# Patient Record
Sex: FEMALE | Race: OTHER | NOT HISPANIC OR LATINO
[De-identification: names, ages, dates, MRNs, and addresses within clinical notes are randomized per-mention and may not be internally consistent; named-entity substitution may affect disease eponyms.]

---

## 2017-02-14 ENCOUNTER — APPOINTMENT (OUTPATIENT)
Dept: VASCULAR SURGERY | Facility: CLINIC | Age: 82
End: 2017-02-14

## 2017-08-24 ENCOUNTER — APPOINTMENT (OUTPATIENT)
Dept: ORTHOPEDIC SURGERY | Facility: CLINIC | Age: 82
End: 2017-08-24
Payer: MEDICARE

## 2017-08-24 ENCOUNTER — APPOINTMENT (OUTPATIENT)
Dept: ORTHOPEDIC SURGERY | Facility: CLINIC | Age: 82
End: 2017-08-24

## 2017-08-24 VITALS
HEART RATE: 80 BPM | HEIGHT: 60 IN | DIASTOLIC BLOOD PRESSURE: 88 MMHG | BODY MASS INDEX: 27.09 KG/M2 | WEIGHT: 138 LBS | SYSTOLIC BLOOD PRESSURE: 158 MMHG

## 2017-08-24 DIAGNOSIS — Z60.2 PROBLEMS RELATED TO LIVING ALONE: ICD-10-CM

## 2017-08-24 DIAGNOSIS — M16.0 BILATERAL PRIMARY OSTEOARTHRITIS OF HIP: ICD-10-CM

## 2017-08-24 DIAGNOSIS — Z80.9 FAMILY HISTORY OF MALIGNANT NEOPLASM, UNSPECIFIED: ICD-10-CM

## 2017-08-24 DIAGNOSIS — Z82.61 FAMILY HISTORY OF ARTHRITIS: ICD-10-CM

## 2017-08-24 PROCEDURE — 99204 OFFICE O/P NEW MOD 45 MIN: CPT

## 2017-08-24 RX ORDER — CALCIUM CARBONATE 600 MG
TABLET ORAL
Refills: 0 | Status: ACTIVE | COMMUNITY

## 2017-08-24 SDOH — SOCIAL STABILITY - SOCIAL INSECURITY: PROBLEMS RELATED TO LIVING ALONE: Z60.2

## 2018-12-19 ENCOUNTER — APPOINTMENT (OUTPATIENT)
Dept: CARDIOLOGY | Facility: CLINIC | Age: 83
End: 2018-12-19

## 2019-10-02 PROBLEM — Z60.2 PERSON LIVING ALONE: Status: ACTIVE | Noted: 2017-08-24

## 2021-01-06 ENCOUNTER — APPOINTMENT (OUTPATIENT)
Dept: COLORECTAL SURGERY | Facility: CLINIC | Age: 86
End: 2021-01-06
Payer: MEDICARE

## 2021-01-06 VITALS
OXYGEN SATURATION: 97 % | WEIGHT: 143 LBS | DIASTOLIC BLOOD PRESSURE: 86 MMHG | BODY MASS INDEX: 28.07 KG/M2 | RESPIRATION RATE: 16 BRPM | SYSTOLIC BLOOD PRESSURE: 157 MMHG | HEIGHT: 60 IN | TEMPERATURE: 98 F | HEART RATE: 60 BPM

## 2021-01-06 DIAGNOSIS — Z86.79 PERSONAL HISTORY OF OTHER DISEASES OF THE CIRCULATORY SYSTEM: ICD-10-CM

## 2021-01-06 DIAGNOSIS — K62.5 HEMORRHAGE OF ANUS AND RECTUM: ICD-10-CM

## 2021-01-06 PROCEDURE — 99205 OFFICE O/P NEW HI 60 MIN: CPT

## 2021-01-06 PROCEDURE — 45300 PROCTOSIGMOIDOSCOPY DX: CPT

## 2021-01-06 RX ORDER — GABAPENTIN 100 MG/1
100 CAPSULE ORAL
Refills: 0 | Status: DISCONTINUED | COMMUNITY
End: 2021-01-06

## 2021-01-06 RX ORDER — METOPROLOL SUCCINATE 50 MG/1
50 TABLET, EXTENDED RELEASE ORAL
Refills: 0 | Status: ACTIVE | COMMUNITY

## 2021-01-06 RX ORDER — IRBESARTAN 150 MG/1
150 TABLET ORAL
Refills: 0 | Status: ACTIVE | COMMUNITY

## 2021-01-06 RX ORDER — HYDROCORTISONE 2.5% 25 MG/G
2.5 CREAM TOPICAL
Qty: 1 | Refills: 3 | Status: ACTIVE | COMMUNITY
Start: 2021-01-06 | End: 1900-01-01

## 2021-01-06 RX ORDER — PRAMIPEXOLE DIHYDROCHLORIDE 0.25 MG/1
0.25 TABLET ORAL
Refills: 0 | Status: DISCONTINUED | COMMUNITY
End: 2021-01-06

## 2021-01-06 NOTE — REVIEW OF SYSTEMS
[As Noted in HPI] : as noted in HPI [Fever] : no fever [Chills] : no chills [Feeling Poorly] : not feeling poorly [Recent Weight Loss (___ Lbs)] : no recent weight loss [Negative] : Musculoskeletal [FreeTextEntry3] : wears glasses [FreeTextEntry5] : HTN [FreeTextEntry9] : Hip replacement bilateral

## 2021-01-06 NOTE — ASSESSMENT
[FreeTextEntry1] : 87-year-old female with chief complaint of bleeding per rectum intermittently. She does have clotted blood and species in her rectal vault but no obvious source. Her hemorrhoids did not appear to be swollen or inflamed.\par \par I had an extensive discussion of the risks and benefits of performing a colonoscopy with the patient.  I explained the risks, which include but are not limited to, bleeding, infection, as well as perforation requiring emergent operative intervention and likely a colostomy. the patient understands these risks and is willing to proceed with their colonoscopy.\par

## 2021-01-06 NOTE — PHYSICAL EXAM
[Normal Breath Sounds] : Normal breath sounds [Normal Heart Sounds] : normal heart sounds [Normal Rate and Rhythm] : normal rate and rhythm [Alert] : alert [Oriented to Person] : oriented to person [Oriented to Place] : oriented to place [Oriented to Time] : oriented to time [Calm] : calm [Abdomen Masses] : No abdominal masses [Tender] : nontender [Normal rectal exam] : exam was normal [None] : no anal fissures seen [Excoriation] : no perianal excoriation [Multiple Sinus Tracts] : no perianal sinus tracts [Fistula] : no fistulas [Wart] : no warts [Ulcer ___ cm] : no ulcers [Pilonidal Cyst] : no pilonidal cysts [Pilonidal Sinus] : no pilonidal sinus [Pilonidal Sinus Draining] : no pilonidal sinus drainage [Tender, Swollen] : nontender, non-swollen [Normal] : was normal [Stool Sample Taken] : no stool obtained on rectal exam [Wheezing] : no wheezing was heard [de-identified] : soft, +BS [de-identified] : rigid sig reveals small clotted blood and stool, no lesions identified to 20cm [de-identified] : elderly female [de-identified] : NC/AT [de-identified] : ambulates with walker [de-identified] : intact

## 2021-01-06 NOTE — HISTORY OF PRESENT ILLNESS
[FreeTextEntry1] : Patient is a 86 yo WF, here with c/o rectal pain and bleeding. Patient experiencing rectal pain and pressure at night while laying down. She saw bloody mucous discharge after bowel movement several days ago. She has intermittent rectal bleeding. Patient generally has hard stools, takes MiraLax to promote bowel movements.

## 2021-02-24 ENCOUNTER — APPOINTMENT (OUTPATIENT)
Dept: OBGYN | Facility: CLINIC | Age: 86
End: 2021-02-24
Payer: MEDICARE

## 2021-02-24 VITALS
WEIGHT: 144 LBS | DIASTOLIC BLOOD PRESSURE: 78 MMHG | SYSTOLIC BLOOD PRESSURE: 159 MMHG | HEIGHT: 60 IN | BODY MASS INDEX: 28.27 KG/M2

## 2021-02-24 DIAGNOSIS — L72.3 SEBACEOUS CYST: ICD-10-CM

## 2021-02-24 PROCEDURE — G0101: CPT

## 2021-02-24 NOTE — HISTORY OF PRESENT ILLNESS
[FreeTextEntry1] : 88 year old female  Ab1, LMP age 32 ([facility] for fibroids) c/o R breast skin tag discovered x 2 years and sharp intermittent pain in perineum x 2 months mostly relieved by changing her underwear.  Pt to have colonoscopy for recent episode of rectal bleeding.

## 2021-02-24 NOTE — PHYSICAL EXAM
[Normal] : normal [The Right Breast Was Examined] : a normal appearance [Breast Nipple Inversion Right] : inverted [Breast Mass Right Breast ___cm] : no was mass palpable [No Masses] : no breast masses were palpable

## 2021-03-13 ENCOUNTER — APPOINTMENT (OUTPATIENT)
Dept: DISASTER EMERGENCY | Facility: CLINIC | Age: 86
End: 2021-03-13

## 2021-03-18 ENCOUNTER — APPOINTMENT (OUTPATIENT)
Dept: COLORECTAL SURGERY | Facility: CLINIC | Age: 86
End: 2021-03-18

## 2021-12-10 ENCOUNTER — APPOINTMENT (OUTPATIENT)
Dept: ENDOCRINOLOGY | Facility: CLINIC | Age: 86
End: 2021-12-10
Payer: MEDICARE

## 2021-12-10 VITALS
HEIGHT: 60 IN | BODY MASS INDEX: 27.48 KG/M2 | WEIGHT: 140 LBS | HEART RATE: 80 BPM | SYSTOLIC BLOOD PRESSURE: 152 MMHG | DIASTOLIC BLOOD PRESSURE: 88 MMHG | OXYGEN SATURATION: 96 %

## 2021-12-10 DIAGNOSIS — Z87.891 PERSONAL HISTORY OF NICOTINE DEPENDENCE: ICD-10-CM

## 2021-12-10 DIAGNOSIS — Z78.9 OTHER SPECIFIED HEALTH STATUS: ICD-10-CM

## 2021-12-10 PROCEDURE — 99204 OFFICE O/P NEW MOD 45 MIN: CPT

## 2021-12-10 RX ORDER — IRBESARTAN 150 MG/1
150 TABLET ORAL
Refills: 0 | Status: ACTIVE | COMMUNITY

## 2021-12-10 RX ORDER — METOPROLOL SUCCINATE 50 MG/1
50 TABLET, EXTENDED RELEASE ORAL
Refills: 0 | Status: ACTIVE | COMMUNITY

## 2021-12-10 RX ORDER — PRAVASTATIN SODIUM 40 MG/1
40 TABLET ORAL
Refills: 0 | Status: ACTIVE | COMMUNITY

## 2021-12-12 NOTE — DATA REVIEWED
[FreeTextEntry1] : Labs done at outside facility on 10/6/21:\par (to be scanned into EHR)\par TSH 1.54

## 2021-12-12 NOTE — ASSESSMENT
[Levothyroxine] : The patient was instructed to take Levothyroxine on an empty stomach, separate from vitamins, and wait at least 30 minutes before eating [FreeTextEntry1] : 1. h/o multinodular goiter\par s/p let subtotal hemithyroidectomy in [facility] [place] , pathology reportedly negative for malignancy\par s/p FNA of another thyroid nodule, patient unsure where/in which lobe and pathology as reportedly.\par Used to take levothyroxine from 1990s-2000s era, however since then no longer required use of thyroid medication.\par Reviewed TSH from 10/2021 is 1.54, within normal range\par Clinically euthyroid today\par Instructed patient to obtain thyroid US sonogram to further evaluate for thyroid nodule and thyroid remnant of left lobe and/or presence of isthmus\par Instructed patient to try to obtain records from previous providers.\par \par Answered all questions today; patient verbalized understanding.\par RTC in 2-3 months.

## 2021-12-12 NOTE — REVIEW OF SYSTEMS
[Difficulty Walking] : difficulty walking [Poor Balance] : poor balance [As Noted in HPI] : as noted in HPI [Fatigue] : no fatigue [Decreased Appetite] : appetite not decreased [Recent Weight Gain (___ Lbs)] : no recent weight gain [Recent Weight Loss (___ Lbs)] : no recent weight loss [Fever] : no fever [Chills] : no chills [Visual Field Defect] : no visual field defect [Dry Eyes] : no dryness [Blurred Vision] : no blurred vision [Dysphagia] : no dysphagia [Neck Pain] : no neck pain [Hearing Loss] : no hearing loss  [Dysphonia] : no dysphonia [Nasal Congestion] : no nasal congestion [Chest Pain] : no chest pain [Slow Heart Rate] : heart rate is not slow [Leg Claudication] : no leg claudication [Palpitations] : no palpitations [Fast Heart Rate] : heart rate is not fast [Lower Ext Edema] : no lower extremity edema [Shortness Of Breath] : no shortness of breath [Cough] : no cough [Nausea] : no nausea [Constipation] : no constipation [Abdominal Pain] : no abdominal pain [Vomiting] : no vomiting [Diarrhea] : no diarrhea [Polyuria] : no polyuria [Joint Pain] : no joint pain [Muscle Weakness] : no muscle weakness [Acanthosis] : no acanthosis  [Acne] : no acne [Dry Skin] : no dry skin [Headaches] : no headaches [Dizziness] : no dizziness [Tremors] : no tremors [Pain/Numbness of Digits] : no pain/numbness of digits

## 2021-12-12 NOTE — PHYSICAL EXAM
[Alert] : alert [No Acute Distress] : no acute distress [Normal Sclera/Conjunctiva] : normal sclera/conjunctiva [EOMI] : extra ocular movement intact [No Proptosis] : no proptosis [No Lid Lag] : no lid lag [Normal Hearing] : hearing was normal [No LAD] : no lymphadenopathy [Thyroid Not Enlarged] : the thyroid was not enlarged [Well Healed Scar] : well healed scar [No Respiratory Distress] : no respiratory distress [Normal Rate and Effort] : normal respiratory rate and effort [Normal Bowel Sounds] : normal bowel sounds [Soft] : abdomen soft [Normal Reflexes] : deep tendon reflexes were 2+ and symmetric [No Tremors] : no tremors [Oriented x3] : oriented to person, place, and time [No Stigmata of Cushings Syndrome] : no stigmata of Cushings Syndrome [Normal Gait] : normal gait [No Clubbing, Cyanosis] : no clubbing  or cyanosis of the fingernails [Abdominal Striae] : no abdominal striae [Acanthosis Nigricans] : no acanthosis nigricans [de-identified] : noted scar at base of neck extending to left, nontender gland upon palpation

## 2022-02-04 ENCOUNTER — APPOINTMENT (OUTPATIENT)
Dept: ULTRASOUND IMAGING | Facility: IMAGING CENTER | Age: 87
End: 2022-02-04
Payer: MEDICARE

## 2022-02-04 ENCOUNTER — OUTPATIENT (OUTPATIENT)
Dept: OUTPATIENT SERVICES | Facility: HOSPITAL | Age: 87
LOS: 1 days | End: 2022-02-04
Payer: MEDICARE

## 2022-02-04 DIAGNOSIS — E89.0 POSTPROCEDURAL HYPOTHYROIDISM: ICD-10-CM

## 2022-02-04 PROCEDURE — 76536 US EXAM OF HEAD AND NECK: CPT | Mod: 26

## 2022-02-04 PROCEDURE — 76536 US EXAM OF HEAD AND NECK: CPT

## 2022-02-11 ENCOUNTER — APPOINTMENT (OUTPATIENT)
Dept: ENDOCRINOLOGY | Facility: CLINIC | Age: 87
End: 2022-02-11
Payer: MEDICARE

## 2022-02-11 VITALS
OXYGEN SATURATION: 94 % | BODY MASS INDEX: 27.48 KG/M2 | DIASTOLIC BLOOD PRESSURE: 70 MMHG | WEIGHT: 140 LBS | HEART RATE: 64 BPM | SYSTOLIC BLOOD PRESSURE: 123 MMHG | HEIGHT: 60 IN

## 2022-02-11 DIAGNOSIS — E89.0 POSTPROCEDURAL HYPOTHYROIDISM: ICD-10-CM

## 2022-02-11 DIAGNOSIS — Z00.00 ENCOUNTER FOR GENERAL ADULT MEDICAL EXAMINATION W/OUT ABNORMAL FINDINGS: ICD-10-CM

## 2022-02-11 DIAGNOSIS — E04.1 NONTOXIC SINGLE THYROID NODULE: ICD-10-CM

## 2022-02-11 PROCEDURE — 36415 COLL VENOUS BLD VENIPUNCTURE: CPT

## 2022-02-11 PROCEDURE — 99213 OFFICE O/P EST LOW 20 MIN: CPT | Mod: 25

## 2022-02-14 LAB
25(OH)D3 SERPL-MCNC: 61.8 NG/ML
ALBUMIN SERPL ELPH-MCNC: 4.3 G/DL
ALP BLD-CCNC: 82 U/L
ALT SERPL-CCNC: 14 U/L
ANION GAP SERPL CALC-SCNC: 12 MMOL/L
AST SERPL-CCNC: 20 U/L
BILIRUB SERPL-MCNC: 0.3 MG/DL
BUN SERPL-MCNC: 25 MG/DL
CALCIUM SERPL-MCNC: 9.8 MG/DL
CALCIUM SERPL-MCNC: 9.8 MG/DL
CHLORIDE SERPL-SCNC: 103 MMOL/L
CO2 SERPL-SCNC: 27 MMOL/L
CREAT SERPL-MCNC: 1.04 MG/DL
ESTIMATED AVERAGE GLUCOSE: 114 MG/DL
GLUCOSE SERPL-MCNC: 105 MG/DL
HBA1C MFR BLD HPLC: 5.6 %
PARATHYROID HORMONE INTACT: 35 PG/ML
POTASSIUM SERPL-SCNC: 4.8 MMOL/L
PROT SERPL-MCNC: 6.2 G/DL
SODIUM SERPL-SCNC: 142 MMOL/L
T4 FREE SERPL-MCNC: 1.2 NG/DL
TSH SERPL-ACNC: 0.97 UIU/ML

## 2022-02-14 NOTE — PHYSICAL EXAM
[Alert] : alert [No Acute Distress] : no acute distress [Normal Sclera/Conjunctiva] : normal sclera/conjunctiva [EOMI] : extra ocular movement intact [No Proptosis] : no proptosis [No Lid Lag] : no lid lag [Normal Hearing] : hearing was normal [No LAD] : no lymphadenopathy [Thyroid Not Enlarged] : the thyroid was not enlarged [Well Healed Scar] : well healed scar [No Respiratory Distress] : no respiratory distress [Normal Rate and Effort] : normal respiratory rate and effort [Normal Bowel Sounds] : normal bowel sounds [Soft] : abdomen soft [No Stigmata of Cushings Syndrome] : no stigmata of Cushings Syndrome [Normal Gait] : normal gait [No Clubbing, Cyanosis] : no clubbing  or cyanosis of the fingernails [Normal Reflexes] : deep tendon reflexes were 2+ and symmetric [No Tremors] : no tremors [Oriented x3] : oriented to person, place, and time [Abdominal Striae] : no abdominal striae [Acanthosis Nigricans] : no acanthosis nigricans [de-identified] : noted scar at base of neck extending to left, nontender gland upon palpation

## 2022-02-14 NOTE — ASSESSMENT
[Levothyroxine] : The patient was instructed to take Levothyroxine on an empty stomach, separate from vitamins, and wait at least 30 minutes before eating [FreeTextEntry1] : 1. h/o multinodular goiter\par s/p let subtotal hem[place]yroidectomy in [facility] [place] , pathology reportedly negative for malignancy\par s/p FNA of another thyroid nodule, patient unsure where/in which lobe and pathology as reportedly.\par Used to take levothyroxine from 1990s-2000s era, however since then no longer required use of thyroid medication.\par Clinically euthyroid today, no compressive symptoms\par Reviewed recent thyroid US sonogram from Feb 2022: noted 3 right thyroid nodules, largest of which is 2.3x1.8x2.2 cm right midpole nodule. Report noted "enlarged multinodular right lobe without suspicious features" \par Recommended to do FNA of this nodules, explained indications, benefits, alternatives and potential risks of this procedure, she understood, however patient deferred FNA until next visit.\par Bloodwork was collected in office today, will f/u results.\par \par Answered all questions today; patient verbalized understanding.\par RTC in 2-3 months.

## 2022-02-14 NOTE — HISTORY OF PRESENT ILLNESS
[FreeTextEntry1] : EDIN FRANCO is a 89 yo female with past medical history of breast cancer diagnosed 40 years ago (in remission), Hodgkin's lymphoma (in remission), h/o thyroid nodule s/p left subtotal thyroidectomy, HTN, HLD, arthritis, OA who presents to clinic today to reestablish care\par \par Patient lives in [facility] living [facility] and recalls she had breast cancer about 40 years ago, currently in remission. At the time of her initial breast cancer diagnosis, she had MRI as per routine surveillance and noted incidentally on imaging finding of thyroid nodule. At the time, clinician had limited information as per patient and decision was made to remove half of her thyroid; she believes only left side of her thyroid was removed. Since the hemithyroidectomy, she notes she was taking Synthroid from 1990s-early 2000s at which time, her PCP was managing and in late 2000s noted upon review of bloodwork that TSH was too low so levothyroxine was discontinued. Since then, she has not required use of levothyroxine again.  She does not have any records with her today. She notes occasionally she may have imaging done for other reasons however she had CT scan of her head done at [facility]  in March 2020, which noted another thyroid nodule (unclear which side) however patient has not been able to follow up until now.\par \par Since last endocrine visit in Dec 2021, patient has since repeated her thyroid sonogram which noted several nodules in right thyroid lobe. \par Patient denies heat or cold intolerance, dyspnea, dysphagia, dysphonia, changes in bowel habits including diarrhea or constipation or any recent change in weight. She does however note cramping/stiffness of her fingers.\par \par PMH: as above\par PSH: b/l hip replacement at hospitals (2017,2018), left mastectomy, hysterectomy at 32y, left subtotal thyroidectomy\par Family Hx: mother- arthritis and brain cancer. Father- Parkinson. No h/o thyroid disease or thyroid cancer. No DM.\par Social Hx: denies tobacco, ETOH or illicit drugs. Retired . . uses rolling walker for ambulation. \par ALL: tetanus (rxn: trouble breathing)\par Home Meds: Metorporlol ER 50mg daily, Pravachol 40mg daily, Irbesartan 150mg daily, turmeric supplements\par Pharmacy: [address], [facility]

## 2022-04-08 DIAGNOSIS — Z12.39 ENCOUNTER FOR OTHER SCREENING FOR MALIGNANT NEOPLASM OF BREAST: ICD-10-CM

## 2022-04-29 ENCOUNTER — APPOINTMENT (OUTPATIENT)
Dept: ENDOCRINOLOGY | Facility: CLINIC | Age: 87
End: 2022-04-29

## 2022-06-02 DIAGNOSIS — N63.0 UNSPECIFIED LUMP IN UNSPECIFIED BREAST: ICD-10-CM

## 2022-07-22 NOTE — HISTORY OF PRESENT ILLNESS
[FreeTextEntry1] : EDIN FRANCO is a 87 yo female with past medical history of breast cancer (in remission), Hodgkin's lymphoma (in remission), h/o thyroid nodule s/p left subtotal thyroidectomy, HTN, HLD, arthritis, OA who presents to clinic today to reestablish care\par \par Patient lives in [facility] living [facility] and recalls she had breast cancer about 40 years ago, currently in remission. At the time of her initial breast cancer diagnosis, she had MRI as per routine surveillance and noted incidentally on imaging finding of thyroid nodule. At the time, clinician had limited information as per patient and decision was made to remove half of her thyroid; she believes only left side of her thyroid was removed. Since the hemithyroidectomy, she notes she was taking Synthroid from 1990s-early 2000s at which time, her PCP was managing and in late 2000s noted upon review of bloodwork that TSH was too low so levothyroxine was discontinued. Since then, she has not required use of levothyroxine again.  She does not have any records with her today. She notes occasionally she may have imaging done for other reasons however she had CT scan of her head done at [facility]  in March 2020, which noted another thyroid nodule (unclear which side) however patient has not been able to follow up until now.\par She is not sure when her last thyroid US was done.\par \par Patient denies heat or cold intolerance, dyspnea, dysphagia, dysphonia, changes in bowel habits including diarrhea or constipation or any recent change in weight. She also denies anxiety, palpitations, tremors/shaking of extremities or insomnia.\par \par PMH: as above\par PSH: b/l hip replacement at [facility] (2017,2018), left mastectomy, hysterectomy at 32y, left subtotal thyroidectomy\par Family Hx: mother- arthritis and brain cancer. Father- Parkinson. No h/o thyroid disease or thyroid cancer. No DM.\par Social Hx: denies tobacco, ETOH or illicit drugs. Retired . . uses rolling walker for ambulation. \par ALL: tetanus (rxn: trouble breathing)\par Home Meds: Metorporlol ER 50mg daily, Pravachol 40mg daily, Irbesartan 150mg daily, turmeric supplements\par Pharmacy: [address], [facility] Clindamycin Counseling: I counseled the patient regarding use of clindamycin as an antibiotic for prophylactic and/or therapeutic purposes. Clindamycin is active against numerous classes of bacteria, including skin bacteria. Side effects may include nausea, diarrhea, gastrointestinal upset, rash, hives, yeast infections, and in rare cases, colitis.

## 2023-10-15 ENCOUNTER — NON-APPOINTMENT (OUTPATIENT)
Age: 88
End: 2023-10-15

## 2024-01-22 ENCOUNTER — INPATIENT (INPATIENT)
Facility: HOSPITAL | Age: 89
LOS: 3 days | Discharge: HOME HEALTH | DRG: 91 | End: 2024-01-26
Attending: INTERNAL MEDICINE | Admitting: INTERNAL MEDICINE
Payer: MEDICARE

## 2024-01-22 VITALS
TEMPERATURE: 98 F | HEART RATE: 94 BPM | DIASTOLIC BLOOD PRESSURE: 86 MMHG | SYSTOLIC BLOOD PRESSURE: 196 MMHG | WEIGHT: 125 LBS | OXYGEN SATURATION: 98 % | RESPIRATION RATE: 18 BRPM

## 2024-01-22 DIAGNOSIS — S01.91XA LACERATION WITHOUT FOREIGN BODY OF UNSPECIFIED PART OF HEAD, INITIAL ENCOUNTER: ICD-10-CM

## 2024-01-22 DIAGNOSIS — Z98.49 CATARACT EXTRACTION STATUS, UNSPECIFIED EYE: Chronic | ICD-10-CM

## 2024-01-22 LAB
ALBUMIN SERPL ELPH-MCNC: 4.3 G/DL — SIGNIFICANT CHANGE UP (ref 3.3–5)
ALP SERPL-CCNC: 93 U/L — SIGNIFICANT CHANGE UP (ref 40–120)
ALT FLD-CCNC: 22 U/L — SIGNIFICANT CHANGE UP (ref 10–45)
ANION GAP SERPL CALC-SCNC: 14 MMOL/L — SIGNIFICANT CHANGE UP (ref 5–17)
APPEARANCE UR: CLEAR — SIGNIFICANT CHANGE UP
APTT BLD: 27.2 SEC — SIGNIFICANT CHANGE UP (ref 24.5–35.6)
AST SERPL-CCNC: 26 U/L — SIGNIFICANT CHANGE UP (ref 10–40)
BACTERIA # UR AUTO: NEGATIVE /HPF — SIGNIFICANT CHANGE UP
BASOPHILS # BLD AUTO: 0.05 K/UL — SIGNIFICANT CHANGE UP (ref 0–0.2)
BASOPHILS NFR BLD AUTO: 0.6 % — SIGNIFICANT CHANGE UP (ref 0–2)
BILIRUB SERPL-MCNC: 0.5 MG/DL — SIGNIFICANT CHANGE UP (ref 0.2–1.2)
BILIRUB UR-MCNC: NEGATIVE — SIGNIFICANT CHANGE UP
BUN SERPL-MCNC: 28 MG/DL — HIGH (ref 7–23)
CALCIUM SERPL-MCNC: 9.8 MG/DL — SIGNIFICANT CHANGE UP (ref 8.4–10.5)
CAST: 1 /LPF — SIGNIFICANT CHANGE UP (ref 0–4)
CHLORIDE SERPL-SCNC: 102 MMOL/L — SIGNIFICANT CHANGE UP (ref 96–108)
CO2 SERPL-SCNC: 25 MMOL/L — SIGNIFICANT CHANGE UP (ref 22–31)
COLOR SPEC: YELLOW — SIGNIFICANT CHANGE UP
CREAT SERPL-MCNC: 0.86 MG/DL — SIGNIFICANT CHANGE UP (ref 0.5–1.3)
DIFF PNL FLD: NEGATIVE — SIGNIFICANT CHANGE UP
EGFR: 64 ML/MIN/1.73M2 — SIGNIFICANT CHANGE UP
EOSINOPHIL # BLD AUTO: 0.03 K/UL — SIGNIFICANT CHANGE UP (ref 0–0.5)
EOSINOPHIL NFR BLD AUTO: 0.4 % — SIGNIFICANT CHANGE UP (ref 0–6)
FLUAV AG NPH QL: SIGNIFICANT CHANGE UP
FLUBV AG NPH QL: SIGNIFICANT CHANGE UP
GLUCOSE SERPL-MCNC: 104 MG/DL — HIGH (ref 70–99)
GLUCOSE UR QL: NEGATIVE MG/DL — SIGNIFICANT CHANGE UP
HCT VFR BLD CALC: 43.7 % — SIGNIFICANT CHANGE UP (ref 34.5–45)
HGB BLD-MCNC: 14.1 G/DL — SIGNIFICANT CHANGE UP (ref 11.5–15.5)
IMM GRANULOCYTES NFR BLD AUTO: 0.4 % — SIGNIFICANT CHANGE UP (ref 0–0.9)
INR BLD: 1.01 RATIO — SIGNIFICANT CHANGE UP (ref 0.85–1.18)
KETONES UR-MCNC: NEGATIVE MG/DL — SIGNIFICANT CHANGE UP
LEUKOCYTE ESTERASE UR-ACNC: NEGATIVE — SIGNIFICANT CHANGE UP
LYMPHOCYTES # BLD AUTO: 0.58 K/UL — LOW (ref 1–3.3)
LYMPHOCYTES # BLD AUTO: 7 % — LOW (ref 13–44)
MAGNESIUM SERPL-MCNC: 2.1 MG/DL — SIGNIFICANT CHANGE UP (ref 1.6–2.6)
MCHC RBC-ENTMCNC: 29.4 PG — SIGNIFICANT CHANGE UP (ref 27–34)
MCHC RBC-ENTMCNC: 32.3 GM/DL — SIGNIFICANT CHANGE UP (ref 32–36)
MCV RBC AUTO: 91 FL — SIGNIFICANT CHANGE UP (ref 80–100)
MONOCYTES # BLD AUTO: 0.43 K/UL — SIGNIFICANT CHANGE UP (ref 0–0.9)
MONOCYTES NFR BLD AUTO: 5.2 % — SIGNIFICANT CHANGE UP (ref 2–14)
NEUTROPHILS # BLD AUTO: 7.22 K/UL — SIGNIFICANT CHANGE UP (ref 1.8–7.4)
NEUTROPHILS NFR BLD AUTO: 86.4 % — HIGH (ref 43–77)
NITRITE UR-MCNC: NEGATIVE — SIGNIFICANT CHANGE UP
NRBC # BLD: 0 /100 WBCS — SIGNIFICANT CHANGE UP (ref 0–0)
PH UR: 7.5 — SIGNIFICANT CHANGE UP (ref 5–8)
PHOSPHATE SERPL-MCNC: 2.9 MG/DL — SIGNIFICANT CHANGE UP (ref 2.5–4.5)
PLATELET # BLD AUTO: 165 K/UL — SIGNIFICANT CHANGE UP (ref 150–400)
POTASSIUM SERPL-MCNC: 4.5 MMOL/L — SIGNIFICANT CHANGE UP (ref 3.5–5.3)
POTASSIUM SERPL-SCNC: 4.5 MMOL/L — SIGNIFICANT CHANGE UP (ref 3.5–5.3)
PROT SERPL-MCNC: 6.8 G/DL — SIGNIFICANT CHANGE UP (ref 6–8.3)
PROT UR-MCNC: 100 MG/DL
PROTHROM AB SERPL-ACNC: 10.6 SEC — SIGNIFICANT CHANGE UP (ref 9.5–13)
RBC # BLD: 4.8 M/UL — SIGNIFICANT CHANGE UP (ref 3.8–5.2)
RBC # FLD: 13.6 % — SIGNIFICANT CHANGE UP (ref 10.3–14.5)
RBC CASTS # UR COMP ASSIST: 2 /HPF — SIGNIFICANT CHANGE UP (ref 0–4)
RSV RNA NPH QL NAA+NON-PROBE: SIGNIFICANT CHANGE UP
SARS-COV-2 RNA SPEC QL NAA+PROBE: SIGNIFICANT CHANGE UP
SODIUM SERPL-SCNC: 141 MMOL/L — SIGNIFICANT CHANGE UP (ref 135–145)
SP GR SPEC: 1.01 — SIGNIFICANT CHANGE UP (ref 1–1.03)
SQUAMOUS # UR AUTO: 1 /HPF — SIGNIFICANT CHANGE UP (ref 0–5)
TROPONIN T, HIGH SENSITIVITY RESULT: 33 NG/L — SIGNIFICANT CHANGE UP (ref 0–51)
TROPONIN T, HIGH SENSITIVITY RESULT: 43 NG/L — SIGNIFICANT CHANGE UP (ref 0–51)
TSH SERPL-MCNC: 0.02 UIU/ML — LOW (ref 0.27–4.2)
UROBILINOGEN FLD QL: 0.2 MG/DL — SIGNIFICANT CHANGE UP (ref 0.2–1)
WBC # BLD: 8.34 K/UL — SIGNIFICANT CHANGE UP (ref 3.8–10.5)
WBC # FLD AUTO: 8.34 K/UL — SIGNIFICANT CHANGE UP (ref 3.8–10.5)
WBC UR QL: 7 /HPF — HIGH (ref 0–5)

## 2024-01-22 PROCEDURE — 71046 X-RAY EXAM CHEST 2 VIEWS: CPT | Mod: 26

## 2024-01-22 PROCEDURE — 12011 RPR F/E/E/N/L/M 2.5 CM/<: CPT

## 2024-01-22 PROCEDURE — 73564 X-RAY EXAM KNEE 4 OR MORE: CPT | Mod: 26,50

## 2024-01-22 PROCEDURE — 70450 CT HEAD/BRAIN W/O DYE: CPT | Mod: 26,MA

## 2024-01-22 PROCEDURE — 99285 EMERGENCY DEPT VISIT HI MDM: CPT | Mod: 25

## 2024-01-22 PROCEDURE — 70486 CT MAXILLOFACIAL W/O DYE: CPT | Mod: 26,MA

## 2024-01-22 PROCEDURE — 73522 X-RAY EXAM HIPS BI 3-4 VIEWS: CPT | Mod: 26

## 2024-01-22 PROCEDURE — 73110 X-RAY EXAM OF WRIST: CPT | Mod: 26,LT

## 2024-01-22 PROCEDURE — 76376 3D RENDER W/INTRP POSTPROCES: CPT | Mod: 26

## 2024-01-22 PROCEDURE — 72125 CT NECK SPINE W/O DYE: CPT | Mod: 26,MA

## 2024-01-22 RX ORDER — METOPROLOL TARTRATE 50 MG
12.5 TABLET ORAL
Refills: 0 | Status: DISCONTINUED | OUTPATIENT
Start: 2024-01-22 | End: 2024-01-23

## 2024-01-22 RX ORDER — PREDNISOLONE SODIUM PHOSPHATE 1 %
3 DROPS OPHTHALMIC (EYE) ONCE
Refills: 0 | Status: COMPLETED | OUTPATIENT
Start: 2024-01-22 | End: 2024-01-22

## 2024-01-22 RX ORDER — AMLODIPINE BESYLATE 2.5 MG/1
10 TABLET ORAL DAILY
Refills: 0 | Status: DISCONTINUED | OUTPATIENT
Start: 2024-01-22 | End: 2024-01-24

## 2024-01-22 RX ORDER — METOPROLOL TARTRATE 50 MG
12.5 TABLET ORAL ONCE
Refills: 0 | Status: COMPLETED | OUTPATIENT
Start: 2024-01-22 | End: 2024-01-22

## 2024-01-22 RX ORDER — CLONAZEPAM 1 MG
0.25 TABLET ORAL EVERY 8 HOURS
Refills: 0 | Status: DISCONTINUED | OUTPATIENT
Start: 2024-01-22 | End: 2024-01-26

## 2024-01-22 RX ORDER — ACETAMINOPHEN 500 MG
975 TABLET ORAL ONCE
Refills: 0 | Status: COMPLETED | OUTPATIENT
Start: 2024-01-22 | End: 2024-01-22

## 2024-01-22 RX ORDER — CYCLOBENZAPRINE HYDROCHLORIDE 10 MG/1
5 TABLET, FILM COATED ORAL ONCE
Refills: 0 | Status: COMPLETED | OUTPATIENT
Start: 2024-01-22 | End: 2024-01-22

## 2024-01-22 RX ORDER — ENOXAPARIN SODIUM 100 MG/ML
40 INJECTION SUBCUTANEOUS EVERY 24 HOURS
Refills: 0 | Status: DISCONTINUED | OUTPATIENT
Start: 2024-01-22 | End: 2024-01-26

## 2024-01-22 RX ORDER — CYCLOBENZAPRINE HYDROCHLORIDE 10 MG/1
5 TABLET, FILM COATED ORAL THREE TIMES A DAY
Refills: 0 | Status: DISCONTINUED | OUTPATIENT
Start: 2024-01-22 | End: 2024-01-26

## 2024-01-22 RX ADMIN — Medication 3 DROP(S): at 14:22

## 2024-01-22 RX ADMIN — Medication 975 MILLIGRAM(S): at 12:20

## 2024-01-22 NOTE — ED PROVIDER NOTE - ATTENDING CONTRIBUTION TO CARE
I performed a history and physical exam of the patient and discussed their management with the resident. I reviewed the resident's note and agree with the documented findings and plan of care.  Anna Carrillo MD

## 2024-01-22 NOTE — H&P ADULT - NSHPPHYSICALEXAM_GEN_ALL_CORE
T(C): 37 (01-22-24 @ 20:05), Max: 37 (01-22-24 @ 20:05)  HR: 79 (01-22-24 @ 20:05) (79 - 94)  BP: 164/64 (01-22-24 @ 20:05) (164/64 - 196/86)  RR: 18 (01-22-24 @ 20:05) (18 - 18)  SpO2: 95% (01-22-24 @ 20:05) (95% - 98%)    PHYSICAL EXAM:  GENERAL: NAD, well-developed  HEAD:  Atraumatic, Normocephalic  EYES: EOMI, PERRLA, conjunctiva and sclera clear  NECK: Supple, No JVD  CHEST/LUNG: Clear to auscultation bilaterally; No wheeze  HEART: Regular rate and rhythm; No murmurs, rubs, or gallops  ABDOMEN: Soft, Nontender, Nondistended; Bowel sounds present  EXTREMITIES:  2+ Peripheral Pulses, No clubbing, cyanosis, or edema  PSYCH: AAOx3  NEUROLOGY: non-focal  SKIN: No rashes or lesions

## 2024-01-22 NOTE — ED PROVIDER NOTE - OTHER FINDINGS
left atrial enlargement, RBBB, LAHB ECG recorded at 2108 independently interpreted by me, Dr Jeffrey Hui, shows normal sinus rhythm with frequent PACs right bundle branch block no acute diagnostic ischemic findings.

## 2024-01-22 NOTE — ED PROVIDER NOTE - CLINICAL SUMMARY MEDICAL DECISION MAKING FREE TEXT BOX
Anna Carrillo MD  89-year-old female with past medical history of HTN, hypercholesterolemia in assisted living presents to the emergency department after a fall when she was coming out of the bathroom, she is supposed to use a walker, she was not using a walker, the glasses got impacted on the left side of her forehead.  on exam, left forehead stellate laceration, neurologic intact, fall concern for fractures r/o left writ, knee, CT head and neck, reassess.

## 2024-01-22 NOTE — H&P ADULT - ASSESSMENT
89-year-old female with past medical history of HTN, hypercholesterolemia in assisted living presents to the emergency department after a fall when she was coming out of the bathroom, she is supposed to use a walker, she was not using a walker, the glasses got impacted on the left side of her forehead.  Patient had recent cataract surgery of the right eye presently on prednisolone drops for that.  She is also followed by ophthalmology for left-sided macular degeneration. the patient had to crawl to the bed to ask for assistance. Has a laceration of the left side of the forehead. 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    ptn is unable to safely ambulate  pt eval  cont outptn meds  DVT ppx w sc Lovenox

## 2024-01-22 NOTE — ED PROCEDURE NOTE - CPROC ED POST PROC CARE GUIDE1
- MSSA bacteremia. Now with Rt. Psoas abscess.  - source likely skin given multiple areas of tear and breakdown  - UCx negative but urine collected after broad spectrum antibiotics so not helpful  - CXR does not show any new consolidation although aspiration still possible  - continue ancef.   - echo -ve for vegetation.   - cultures from the psoas abscess growing growing staph.  - blood cultures from 4/19 NTD.   - Anticipate 6 weeks of IV antibiotics from negative blood culture. Will need PICC line prior to discharge. Estimated end date 5/31/17.   - Will need ID to follow outpatient labs as patient will need weekly CBC, CMP, ESR, and CRP (our clinic fax is [phone])       Verbal/written post procedure instructions were given to patient/caregiver./Instructed patient/caregiver to follow-up with primary care physician./Instructed patient/caregiver regarding signs and symptoms of infection./Keep the cast/splint/dressing clean and dry.

## 2024-01-22 NOTE — ED PROVIDER NOTE - NS_BEDUNITTYPES_ED_ALL_ED
Phone Number Called: [phone] (home)       Message: pt called in because she had tested positive for chlamydia so she took her antibiotics that were prescribed and her partner was tested as well and he came back negative so she is wondering if she needs to be restested or if this normal.    Left Message for patient to call back: N\A       TELEMETRY

## 2024-01-22 NOTE — ED PROVIDER NOTE - PROGRESS NOTE DETAILS
Laceration to L temple repaired w/absorbable sutures, CT scans negative for acute trauma and pt/daughters at bedside aware of thyroid nodules. Signed out pending trial of ambulation and dispo, pt has her personal walker bedside. - Jaclyn Hardin, PGY-3 PGY1/Abad, DO: Patient was signed out to me at 7 PM, presenting with fall and associated injuries, imaging and labs unremarkable, TSH low however patient states that she has a history of hypothyroidism however her PCP took her off Synthroid for unspecified reason.  During my conversation with her, she endorsed lightheadedness prior to the fall, does not recall the fall and is not able to provide a clear history to lead me to believe that this is mechanical.  Patient intermittently lightheaded during my encounter, she was able to ambulate throughout the hallway with a walker however is significant fall risk given recent fall, frailty and ambulates at baseline with a walker.  Discussed case with CDU for further presyncopal workup, declines as patient is a fall risk, will plan for admission to medicine with telemetry. PGY1/Abad, DO: Patient was signed out to me at 7 PM, presenting with fall and associated injuries, imaging and labs unremarkable, TSH low however patient states that she has a history of hypothyroidism however her PCP took her off Synthroid for unspecified reason.  During my conversation with her, she endorsed lightheadedness prior to the fall, does not recall the fall and is not able to provide a clear history to lead me to believe that this is mechanical, as it was unwitnessed.  Patient intermittently lightheaded during my encounter, she was able to ambulate throughout the hallway with a walker however is significant fall risk given recent fall, frailty and ambulates at baseline with a walker.  Discussed case with CDU for further presyncopal workup, declines as patient is a fall risk, will plan for admission to medicine with telemetry. Attempted to reach PCP Dr. Angel for admission [place] [facility] [place] [facility]CP Dr. Angel for admission, waiting for call back to admit to her vs. med here Left [facility] with PCP Dr. Angel for admission, call returned, discussed case, plan for admission to Dr. Raymond. contacted Dr. Raymond at this time Left [facility] with PCP Dr. Angel for admission, call returned, discussed case, plan for admission to Dr. Raymond. contacted Dr. Raymond at this time, admitted

## 2024-01-22 NOTE — ED ADULT NURSE NOTE - NSFALLHARMRISKINTERV_ED_ALL_ED
Assistance OOB with selected safe patient handling equipment if applicable/Communicate risk of Fall with Harm to all staff, patient, and family/Monitor gait and stability/Provide patient with walking aids/Provide visual cue: red socks, yellow wristband, yellow gown, etc/Reinforce activity limits and safety measures with patient and family/Bed in lowest position, wheels locked, appropriate side rails in place/Call bell, personal items and telephone in reach/Instruct patient to call for assistance before getting out of bed/chair/stretcher/Non-slip footwear applied when patient is off stretcher/[place] to call system/Physically safe environment - no spills, clutter or unnecessary equipment/Purposeful Proactive Rounding/Room/bathroom lighting operational, light cord in reach

## 2024-01-22 NOTE — ED PROVIDER NOTE - NSFOLLOWUPINSTRUCTIONS_ED_ALL_ED_FT
You were seen and evaluated in the Emergency Department for your fall.     Clinical examination and history demonstrated no acute evidence of any life-threatening risks to your health as a result of your fall.     CT scan of your head and neck demonstrated no acute fractures, dislocations, or hemorrhage.     While emergent evaluation does not demonstrate any immediate life-threats, we strongly recommend you follow up with primary care doctor for a comprehensive evaluation of your health.     Please follow up with your doctor within 1 week. Bring copies of your results with you (provided in your discharge paperwork). Please stay well-hydrated.    You may take 500-1000 mg acetaminophen (tylenol) every 6 hours, as needed for pain.  You may take 600 mg ibuprofen every 8 hours, with food, as needed for pain.  You can take tylenol and ibuprofen at the same time.     WOUND CARE:  1. Keep the wound clean and dry for 24 hours then gently rinse the wound daily without scrubbing.  Apply neosporin or bacitracin 1-2 times daily if desired. THESE ARE ABSORBABLE STITCHES, no need to remove.  2. Return to the ED with new or worsening symptoms including fever, pus, redness, or uncontrolled pain.    Should you develop nausea, vomiting, worsening headaches, inability to walk properly, numbness or tingling in the extremities, dizziness, or changes to your hearing/vision - please immediately return to the Emergency Department for evaluation of your symptoms.     Should you develop persistent headaches, you can call the following number to schedule an appointment with our Neurology partners:    [facility]  Neurology  [address] [place] [address] [address]rd [place] [address]  Phone: [phone]

## 2024-01-22 NOTE — ED PROVIDER NOTE - OBJECTIVE STATEMENT
Anna Carrillo MD  89-year-old female with past medical history of HTN, hypercholesterolemia in assisted living presents to the emergency department after a fall when she was coming out of the bathroom, she is supposed to use a walker, she was not using a walker, the glasses got impacted on the left side of her forehead.  Patient had recent cataract surgery of the right eye presently on prednisolone drops for that.  She is also followed by ophthalmology for left-sided macular degeneration. the patient had to crawl to the bed to ask for assistance. Has a laceration of the left side of the forehead. Patient reports anaphylaxis to teatnusvaccine when she was in her 20's.

## 2024-01-22 NOTE — H&P ADULT - HISTORY OF PRESENT ILLNESS
89-year-old female with past medical history of HTN, hypercholesterolemia in assisted living presents to the emergency department after a fall when she was coming out of the bathroom, she is supposed to use a walker, she was not using a walker, the glasses got impacted on the left side of her forehead.  Patient had recent cataract surgery of the right eye presently on prednisolone drops for that.  She is also followed by ophthalmology for left-sided macular degeneration. the patient had to crawl to the bed to ask for assistance. Has a laceration of the left side of the forehead. 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

## 2024-01-22 NOTE — ED ADULT NURSE NOTE - OBJECTIVE STATEMENT
pt 89 yo female via ems with daughter from assisted living s/p unwitnessed fall head injury lac to left forehead over ey bleeding controlled pt alert benji states her feet got tangled approx 7am incident pt skin warm dry recent cataract surgery to right eye verbalized denies any visual changes pt voided urine to bed pan labs sent as ordered with urine also radiological studies posted and pending

## 2024-01-23 DIAGNOSIS — E04.1 NONTOXIC SINGLE THYROID NODULE: ICD-10-CM

## 2024-01-23 LAB
CULTURE RESULTS: SIGNIFICANT CHANGE UP
SPECIMEN SOURCE: SIGNIFICANT CHANGE UP
T3 SERPL-MCNC: 119 NG/DL — SIGNIFICANT CHANGE UP (ref 80–200)
T4 AB SER-ACNC: 8.1 UG/DL — SIGNIFICANT CHANGE UP (ref 4.6–12)
T4 FREE SERPL-MCNC: 1.2 NG/DL — SIGNIFICANT CHANGE UP (ref 0.9–1.8)
TSH SERPL-MCNC: 0.03 UIU/ML — LOW (ref 0.27–4.2)

## 2024-01-23 RX ORDER — PREDNISOLONE SODIUM PHOSPHATE 1 %
1 DROPS OPHTHALMIC (EYE)
Refills: 0 | DISCHARGE

## 2024-01-23 RX ORDER — PREDNISOLONE SODIUM PHOSPHATE 1 %
1 DROPS OPHTHALMIC (EYE) THREE TIMES A DAY
Refills: 0 | Status: COMPLETED | OUTPATIENT
Start: 2024-01-23 | End: 2024-01-24

## 2024-01-23 RX ORDER — BROMFENAC 0.76 MG/ML
1 SOLUTION/ DROPS OPHTHALMIC
Refills: 0 | DISCHARGE

## 2024-01-23 RX ORDER — ATORVASTATIN CALCIUM 80 MG/1
10 TABLET, FILM COATED ORAL AT BEDTIME
Refills: 0 | Status: DISCONTINUED | OUTPATIENT
Start: 2024-01-23 | End: 2024-01-26

## 2024-01-23 RX ORDER — METOPROLOL TARTRATE 50 MG
1 TABLET ORAL
Refills: 0 | DISCHARGE

## 2024-01-23 RX ORDER — KETOROLAC TROMETHAMINE 0.5 %
1 DROPS OPHTHALMIC (EYE) DAILY
Refills: 0 | Status: DISCONTINUED | OUTPATIENT
Start: 2024-01-23 | End: 2024-01-26

## 2024-01-23 RX ORDER — METOPROLOL TARTRATE 50 MG
50 TABLET ORAL DAILY
Refills: 0 | Status: DISCONTINUED | OUTPATIENT
Start: 2024-01-23 | End: 2024-01-26

## 2024-01-23 RX ORDER — PREDNISOLONE SODIUM PHOSPHATE 1 %
1 DROPS OPHTHALMIC (EYE)
Refills: 0 | Status: DISCONTINUED | OUTPATIENT
Start: 2024-01-24 | End: 2024-01-26

## 2024-01-23 RX ADMIN — Medication 12.5 MILLIGRAM(S): at 00:38

## 2024-01-23 RX ADMIN — Medication 12.5 MILLIGRAM(S): at 05:52

## 2024-01-23 RX ADMIN — Medication 1 DROP(S): at 21:21

## 2024-01-23 RX ADMIN — AMLODIPINE BESYLATE 10 MILLIGRAM(S): 2.5 TABLET ORAL at 05:49

## 2024-01-23 RX ADMIN — Medication 50 MILLIGRAM(S): at 18:11

## 2024-01-23 RX ADMIN — Medication 1 DROP(S): at 17:42

## 2024-01-23 RX ADMIN — CYCLOBENZAPRINE HYDROCHLORIDE 5 MILLIGRAM(S): 10 TABLET, FILM COATED ORAL at 00:38

## 2024-01-23 RX ADMIN — Medication 1 DROP(S): at 19:16

## 2024-01-23 RX ADMIN — Medication 0.25 MILLIGRAM(S): at 02:19

## 2024-01-23 RX ADMIN — ATORVASTATIN CALCIUM 10 MILLIGRAM(S): 80 TABLET, FILM COATED ORAL at 21:24

## 2024-01-23 NOTE — PATIENT PROFILE ADULT - FUNCTIONAL SCREEN CURRENT LEVEL: SWALLOWING (IF SCORE 2 OR MORE FOR ANY ITEM, CONSULT REHAB SERVICES), MLM)
Continue same.  Recheck as usual
Received fax from Md INR reporting patient INR was 2.3 on 10/7/19. Patient is taking 5mg of coumadin every evening.   Please advise
Spoke with patient wife and instructed her to Continue coumadin 5 mg daily pm and recheck in 1 week. Please call us with the results. Wife verbally understood. No other questions at this time.
0 = swallows foods/liquids without difficulty

## 2024-01-23 NOTE — PROGRESS NOTE ADULT - ASSESSMENT
89-year-old female with past medical history of HTN, hypercholesterolemia in assisted living presents to the emergency department after a fall when she was coming out of the bathroom, she is supposed to use a walker, she was not using a walker, the glasses got impacted on the left side of her forehead.  Patient had recent cataract surgery of the right eye presently on prednisolone drops for that.  She is also followed by ophthalmology for left-sided macular degeneration. the patient had to crawl to the bed to ask for assistance. Has a laceration of the left side of the forehead. 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    ptn is unable to safely ambulate  BP is elevated, resume outptn BB dose. seen by card. no events on tele. check orthostatics and TTE  pt eval  cont outptn meds  DVT ppx w sc Lovenox

## 2024-01-23 NOTE — CONSULT NOTE ADULT - PROBLEM SELECTOR RECOMMENDATION 9
Will get full thyroid panel  Suggest to repeat thyroid function test in 4-6 weeks. Outpatient follow up.   Outpatient Thyroid US for surveillance follow up

## 2024-01-23 NOTE — PATIENT PROFILE ADULT - FALL HARM RISK - HARM RISK INTERVENTIONS

## 2024-01-23 NOTE — CONSULT NOTE ADULT - SUBJECTIVE AND OBJECTIVE BOX
HPI:  89-year-old female well known to our group, best to Dr Leonard, last seen in office 12/1/23 in stable cardiac condition. + HTN, hypercholesterolemia, lives  in [facility] assisted living presents to the emergency department after a fall when she was coming out of the bathroom, she is supposed to use a walker, she was not using a walker, her  glasses got impacted on the left side of her forehead.  Patient had recent cataract surgery of the right eye presently on prednisolone drops for that.  She is also followed by ophthalmology for left-sided macular degeneration.The patient had to crawl to the bed to ask for assistance. Has a laceration of the left side of the forehead. Imaging unremarkable. NO clear LOC, claims she is not sure. No chest pain or dyspnea. TSH low .03. Daughter at bedside. Last office echo 6/22/23 ef 70%, mild mr, mild ai.       PAST MEDICAL & SURGICAL HISTORY:  HTN (hypertension)      HLD (hyperlipidemia)      Hard of hearing      S/P cataract surgery      Allergies    adhesives (Unknown)  tetanus toxoid (Unknown)      MEDICATIONS:  amLODIPine   Tablet 10 milliGRAM(s) Oral daily  atorvastatin 10 milliGRAM(s) Oral at bedtime  clonazePAM  Tablet 0.25 milliGRAM(s) Oral every 8 hours PRN  cyclobenzaprine 5 milliGRAM(s) Oral three times a day PRN  enoxaparin Injectable 40 milliGRAM(s) SubCutaneous every 24 hours  metoprolol tartrate 12.5 milliGRAM(s) Oral two times a day  prednisoLONE acetate 1% Suspension 1 Drop(s) Right EYE three times a day      REVIEW OF SYSTEMS:    CONSTITUTIONAL: No weakness, fevers or chills  EYES/ENT: No visual changes;  No vertigo or throat pain   NECK: No pain or stiffness  RESPIRATORY: No cough, wheezing, hemoptysis; No shortness of breath  CARDIOVASCULAR: No chest pain or palpitations  GASTROINTESTINAL: No abdominal or epigastric pain. No nausea, vomiting, or hematemesis; No diarrhea or constipation. No melena or hematochezia.  GENITOURINARY: No dysuria, frequency or hematuria  NEUROLOGICAL: No numbness or weakness  SKIN: No itching, burning, rashes, or lesions   All other review of systems is negative unless indicated above    Vital Signs Last 24 Hrs  T(C): 36.7 (23 Jan 2024 07:24), Max: 37 (22 Jan 2024 20:05)  T(F): 98 (23 Jan 2024 07:24), Max: 98.6 (22 Jan 2024 20:05)  HR: 64 (23 Jan 2024 07:24) (64 - 92)  BP: 176/71 (23 Jan 2024 07:24) (147/51 - 189/79)  BP(mean): 108 (23 Jan 2024 00:27) (87 - 108)  RR: 18 (23 Jan 2024 07:24) (18 - 19)  SpO2: 95% (23 Jan 2024 07:24) (94% - 97%)    Parameters below as of 23 Jan 2024 07:24  Patient On (Oxygen Delivery Method): room air      PHYSICAL EXAM:    Constitutional: NAD, awake and alert, well-developed.   HEENT: PERR, EOMI. LAC above left eye.   Neck: soft and supple, No LAD, No JVD  Respiratory: Breath sounds are clear bilaterally, No wheezing, rales or rhonchi  Cardiovascular: Regular rate and rhythm, normal S1 and S2,  no murmurs, gallops or rubs  Extremities: No peripheral edema. No clubbing or cyanosis.  Neurological: A/O x 3, no focal deficits      LABS: All Labs Reviewed:                        14.1   8.34  )-----------( 165      ( 22 Jan 2024 12:26 )             43.7     22 Jan 2024 12:26    141    |  102    |  28     ----------------------------<  104    4.5     |  25     |  0.86     Ca    9.8        22 Jan 2024 12:26  Phos  2.9       22 Jan 2024 12:26  Mg     2.1       22 Jan 2024 12:26    TPro  6.8    /  Alb  4.3    /  TBili  0.5    /  DBili  x      /  AST  26     /  ALT  22     /  AlkPhos  93     22 Jan 2024 12:26    PT/INR - ( 22 Jan 2024 12:26 )   PT: 10.6 sec;   INR: 1.01 ratio         PTT - ( 22 Jan 2024 12:26 )  PTT:27.2 sec    TSH: Thyroid Stimulating Hormone, Serum: 0.03 uIU/mL (01-23 @ 07:12)  Thyroid Stimulating Hormone, Serum: 0.02 uIU/mL (01-22 @ 12:26)    EKG: Sr LAE, rbbb, lahb. On several ekgs, atrial bigeminy, pvcs

## 2024-01-23 NOTE — PATIENT PROFILE ADULT - FUNCTIONAL ASSESSMENT - BASIC MOBILITY 3.
(1) Please followup with your cardiologist Dr. Sharpe on June 19th at 12:20pm. His office may call you for a sooner appointment. Please call [phone] with any questions.    (2) Please followup with your interventionalist Dr. Ca on July 3rd at 11am. His office may call you for a sooner appointment. Please call [phone] = A little assistance

## 2024-01-23 NOTE — PROGRESS NOTE ADULT - SUBJECTIVE AND OBJECTIVE BOX
Patient is a 89y old  Female who presents with a chief complaint of     SUBJECTIVE / OVERNIGHT EVENTS: BP is elevated, resume outptn BB dose. seen by card. no events on tele. check orthostatics and TTE    MEDICATIONS  (STANDING):  amLODIPine   Tablet 10 milliGRAM(s) Oral daily  atorvastatin 10 milliGRAM(s) Oral at bedtime  enoxaparin Injectable 40 milliGRAM(s) SubCutaneous every 24 hours  metoprolol succinate ER 50 milliGRAM(s) Oral daily  prednisoLONE acetate 1% Suspension 1 Drop(s) Right EYE three times a day    MEDICATIONS  (PRN):  clonazePAM  Tablet 0.25 milliGRAM(s) Oral every 8 hours PRN anxiety  cyclobenzaprine 5 milliGRAM(s) Oral three times a day PRN Muscle Spasm      Vital Signs Last 24 Hrs  T(F): 98.1 (24 @ 12:24), Max: 98.6 (24 @ 20:05)  HR: 69 (24 @ 12:24) (64 - 88)  BP: 154/67 (24 @ 12:24) (147/51 - 189/79)  RR: 18 (24 @ 12:24) (18 - 19)  SpO2: 95% (24 @ 12:24) (94% - 97%)  Telemetry:   CAPILLARY BLOOD GLUCOSE        I&O's Summary      PHYSICAL EXAM:  GENERAL: NAD, well-developed  HEAD:  Atraumatic, Normocephalic  EYES: EOMI, PERRLA, conjunctiva and sclera clear  NECK: Supple, No JVD  CHEST/LUNG: Clear to auscultation bilaterally; No wheeze  HEART: Regular rate and rhythm; No murmurs, rubs, or gallops  ABDOMEN: Soft, Nontender, Nondistended; Bowel sounds present  EXTREMITIES:  2+ Peripheral Pulses, No clubbing, cyanosis, or edema  PSYCH: AAOx3  NEUROLOGY: non-focal  SKIN: No rashes or lesions    LABS:                        14.1   8.34  )-----------( 165      ( 2024 12:26 )             43.7         141  |  102  |  28<H>  ----------------------------<  104<H>  4.5   |  25  |  0.86    Ca    9.8      2024 12:26  Phos  2.9       Mg     2.1         TPro  6.8  /  Alb  4.3  /  TBili  0.5  /  DBili  x   /  AST  26  /  ALT  22  /  AlkPhos  93      PT/INR - ( 2024 12:26 )   PT: 10.6 sec;   INR: 1.01 ratio         PTT - ( 2024 12:26 )  PTT:27.2 sec      Urinalysis Basic - ( 2024 12:26 )    Color: Yellow / Appearance: Clear / S.013 / pH: x  Gluc: 104 mg/dL / Ketone: Negative mg/dL  / Bili: Negative / Urobili: 0.2 mg/dL   Blood: x / Protein: 100 mg/dL / Nitrite: Negative   Leuk Esterase: Negative / RBC: 2 /HPF / WBC 7 /HPF   Sq Epi: x / Non Sq Epi: 1 /HPF / Bacteria: Negative /HPF        RADIOLOGY & ADDITIONAL TESTS:    Imaging Personally Reviewed:    Consultant(s) Notes Reviewed:      Care Discussed with Consultants/Other Providers:

## 2024-01-23 NOTE — CONSULT NOTE ADULT - SUBJECTIVE AND OBJECTIVE BOX
HPI:  89-year-old female with past medical history of HTN, hypercholesterolemia in assisted living presents to the emergency department after a fall when she was coming out of the bathroom, she is supposed to use a walker, she was not using a walker, the glasses got impacted on the left side of her forehead.  Patient had recent cataract surgery of the right eye presently on prednisolone drops for that.  She is also followed by ophthalmology for left-sided macular degeneration. the patient had to crawl to the bed to ask for assistance. Has a laceration of the left side of the forehead. 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 (22 Jan 2024 23:14)        Endo was consulted for Low TSH      PAST MEDICAL & SURGICAL HISTORY:  HTN (hypertension)      HLD (hyperlipidemia)      Hard of hearing      S/P cataract surgery          FAMILY HISTORY:      Social History:            HOME MEDICATIONS:  Home Medications:  irbesartan 150 mg oral tablet: 1 tab(s) orally once a day (23 Jan 2024 10:52)  metoprolol succinate 50 mg oral tablet, extended release: 1 tab(s) orally once a day (23 Jan 2024 10:52)  pravastatin 40 mg oral tablet: 1 tab(s) orally once a day (at bedtime) (23 Jan 2024 10:52)  prednisoLONE acetate 1% ophthalmic suspension: 1 drop(s) in the right eye 3 times a day until 1/24/24 (23 Jan 2024 10:52)  prednisoLONE acetate 1% ophthalmic suspension: 1 drop(s) in the right eye 2 times a day from 1/25/24 to 2/7/24 (23 Jan 2024 10:53)  Prolensa 0.07% ophthalmic solution: 1 drop(s) in the right eye once a day until 2/10/24 (23 Jan 2024 10:52)            MEDICATIONS  (STANDING):  amLODIPine   Tablet 10 milliGRAM(s) Oral daily  atorvastatin 10 milliGRAM(s) Oral at bedtime  enoxaparin Injectable 40 milliGRAM(s) SubCutaneous every 24 hours  metoprolol succinate ER 50 milliGRAM(s) Oral daily  prednisoLONE acetate 1% Suspension 1 Drop(s) Right EYE three times a day    MEDICATIONS  (PRN):  clonazePAM  Tablet 0.25 milliGRAM(s) Oral every 8 hours PRN anxiety  cyclobenzaprine 5 milliGRAM(s) Oral three times a day PRN Muscle Spasm      Allergies    adhesives (Unknown)  tetanus toxoid (Unknown)    Intolerances        Review of Systems:  Neuro: No HA, no dizziness  Cardiovascular: No chest pain, no palpitations  Respiratory: no SOB, no cough  GI: No nausea, vomiting, abdominal pain  MSK: Denies joint/muscle pain      ALL OTHER SYSTEMS REVIEWED AND NEGATIVE        PHYSICAL EXAM:  VITALS: T(C): 36.7 (01-23-24 @ 12:24)  T(F): 98.1 (01-23-24 @ 12:24), Max: 98.6 (01-22-24 @ 20:05)  HR: 69 (01-23-24 @ 12:24) (64 - 92)  BP: 154/67 (01-23-24 @ 12:24) (147/51 - 189/79)  RR:  (18 - 19)  SpO2:  (94% - 97%)  Wt(kg): --  GENERAL: NAD, well-groomed, well-developed  NEURO:  alert and oriented  RESPIRATORY: Clear to auscultation bilaterally; No rales, rhonchi, wheezing  CARDIOVASCULAR: Si S2  GI: Soft, non distended, normal bowel sounds  MUSCULOSKELETAL: Moves all extremities equally                                 14.1   8.34  )-----------( 165      ( 22 Jan 2024 12:26 )             43.7       01-22    141  |  102  |  28<H>  ----------------------------<  104<H>  4.5   |  25  |  0.86    eGFR: 64    Ca    9.8      01-22  Mg     2.1     01-22  Phos  2.9     01-22    TPro  6.8  /  Alb  4.3  /  TBili  0.5  /  DBili  x   /  AST  26  /  ALT  22  /  AlkPhos  93  01-22      Thyroid Function Tests:  01-23 @ 07:12 TSH 0.03 FreeT4 -- T3 119 Anti TPO -- Anti Thyroglobulin Ab -- TSI --  01-22 @ 12:26 TSH 0.02 FreeT4 -- T3 -- Anti TPO -- Anti Thyroglobulin Ab -- TSI --    Diet, DASH/TLC:   Sodium & Cholesterol Restricted (01-22-24 @ 23:20) [Active]                             HPI:  89-year-old female with past medical history of HTN, hypercholesterolemia in assisted living presents to the emergency department after a fall when she was coming  out of the bathroom, she is supposed to use a walker, she was not using a walker, the glasses got impacted on the left side of her forehead.  Patient had recent cataract surgery of the right eye presently on prednisolone drops for that.  She is also followed by ophthalmology for left-sided macular degeneration. the patient had to crawl to the bed to ask for assistance. Has a laceration of the left side of the forehead. 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 (22 Jan 2024 23:14)        Endo was consulted for Low TSH      PAST MEDICAL & SURGICAL HISTORY:  HTN (hypertension)      HLD (hyperlipidemia)      Hard of hearing      S/P cataract surgery          FAMILY HISTORY:      Social History:            HOME MEDICATIONS:  Home Medications:  irbesartan 150 mg oral tablet: 1 tab(s) orally once a day (23 Jan 2024 10:52)  metoprolol succinate 50 mg oral tablet, extended release: 1 tab(s) orally once a day (23 Jan 2024 10:52)  pravastatin 40 mg oral tablet: 1 tab(s) orally once a day (at bedtime) (23 Jan 2024 10:52)  prednisoLONE acetate 1% ophthalmic suspension: 1 drop(s) in the right eye 3 times a day until 1/24/24 (23 Jan 2024 10:52)  prednisoLONE acetate 1% ophthalmic suspension: 1 drop(s) in the right eye 2 times a day from 1/25/24 to 2/7/24 (23 Jan 2024 10:53)  Prolensa 0.07% ophthalmic solution: 1 drop(s) in the right eye once a day until 2/10/24 (23 Jan 2024 10:52)            MEDICATIONS  (STANDING):  amLODIPine   Tablet 10 milliGRAM(s) Oral daily  atorvastatin 10 milliGRAM(s) Oral at bedtime  enoxaparin Injectable 40 milliGRAM(s) SubCutaneous every 24 hours  metoprolol succinate ER 50 milliGRAM(s) Oral daily  prednisoLONE acetate 1% Suspension 1 Drop(s) Right EYE three times a day    MEDICATIONS  (PRN):  clonazePAM  Tablet 0.25 milliGRAM(s) Oral every 8 hours PRN anxiety  cyclobenzaprine 5 milliGRAM(s) Oral three times a day PRN Muscle Spasm      Allergies    adhesives (Unknown)  tetanus toxoid (Unknown)    Intolerances        Review of Systems:  Neuro: No HA, no dizziness  Cardiovascular: No chest pain, no palpitations  Respiratory: no SOB, no cough  GI: No nausea, vomiting, abdominal pain  MSK: Denies joint/muscle pain      ALL OTHER SYSTEMS REVIEWED AND NEGATIVE        PHYSICAL EXAM:  VITALS: T(C): 36.7 (01-23-24 @ 12:24)  T(F): 98.1 (01-23-24 @ 12:24), Max: 98.6 (01-22-24 @ 20:05)  HR: 69 (01-23-24 @ 12:24) (64 - 92)  BP: 154/67 (01-23-24 @ 12:24) (147/51 - 189/79)  RR:  (18 - 19)  SpO2:  (94% - 97%)  Wt(kg): --  GENERAL: NAD, well-groomed, well-developed  NEURO:  alert and oriented  RESPIRATORY: Clear to auscultation bilaterally; No rales, rhonchi, wheezing  CARDIOVASCULAR: Si S2  GI: Soft, non distended, normal bowel sounds  MUSCULOSKELETAL: Moves all extremities equally                                 14.1   8.34  )-----------( 165      ( 22 Jan 2024 12:26 )             43.7       01-22    141  |  102  |  28<H>  ----------------------------<  104<H>  4.5   |  25  |  0.86    eGFR: 64    Ca    9.8      01-22  Mg     2.1     01-22  Phos  2.9     01-22    TPro  6.8  /  Alb  4.3  /  TBili  0.5  /  DBili  x   /  AST  26  /  ALT  22  /  AlkPhos  93  01-22      Thyroid Function Tests:  01-23 @ 07:12 TSH 0.03 FreeT4 -- T3 119 Anti TPO -- Anti Thyroglobulin Ab -- TSI --  01-22 @ 12:26 TSH 0.02 FreeT4 -- T3 -- Anti TPO -- Anti Thyroglobulin Ab -- TSI --    Diet, DASH/TLC:   Sodium & Cholesterol Restricted (01-22-24 @ 23:20) [Active]

## 2024-01-23 NOTE — PHARMACOTHERAPY INTERVENTION NOTE - COMMENTS
Confirmed home medications with patient, pharmacy, and the [facility], updated in Outpatient Medication Review.    Home Medications:  irbesartan 150 mg oral tablet: 1 tab(s) orally once a day  metoprolol succinate 50 mg oral tablet, extended release: 1 tab(s) orally once a day  pravastatin 40 mg oral tablet: 1 tab(s) orally once a day (at bedtime)  prednisoLONE acetate 1% ophthalmic suspension: 1 drop(s) in the right eye 3 times a day until 1/24/24  prednisoLONE acetate 1% ophthalmic suspension: 1 drop(s) in the right eye 2 times a day from 1/25/24 to 2/7/24  Prolensa 0.07% ophthalmic solution: 1 drop(s) in the right eye once a day until 2/10/24    Patient had cataracts surgery in her right eye recently and is currently on prednisolone eye drops taper and Prolensa eye drops. Patient's daughter will bring in Prolensa eye drops from home for inpatient use. Communicated discrepancies with BASIL Valdez.     Roro JulesD  Transitions of Care Pharmacist  Available on Microsoft Teams  Spectra #[phone]

## 2024-01-23 NOTE — CONSULT NOTE ADULT - ASSESSMENT
#HTN  #hypercholesterolemia  #left-sided macular degeneration.  #s/p fall when she was coming out of the bathroom, she is supposed to use a walker, she was not using a walker, her  glasses got impacted on the left side of her forehead.  Imaging unremarkable, LAC above left eye.    NO clear LOC, claims she is not sure. No chest pain or dyspnea. TSH low .03. Daughter at bedside.   Monitor on telemetry. Repeat echo please.

## 2024-01-23 NOTE — CONSULT NOTE ADULT - ASSESSMENT
89-year-old female with past medical history of HTN, hypercholesterolemia in assisted living presents to the emergency department after a fall     Assessment:  ?Hyperthyroid: No HX of thyroid dz, Known thyroid nodules from 2022,  not on thyroid supplements  found to have suppressed TSH, Free thyroxine not available, added on.   Thyroid US 2022: several R lobe nodules  HTN: on antihypertensive medications, monitored, asymptomatic.    Discussed plan and management wit Dr Jeannie Ramos NP-TEAMS  Kj Dennis MD  Cell: [phone]  Office: [phone]             89-year-old female with past medical history of HTN, hypercholesterolemia in assisted living presents to the emergency department after a fall     Assessment:  ?Hyperthyroid: No HX of thyroid dz, Known thyroid nodules from 2022,  not on thyroid supplements  found to have suppressed TSH, Free  thyroxine not available, added on.   Thyroid US 2022: several R lobe nodules  HTN: on antihypertensive medications, monitored, asymptomatic.    Discussed plan and management wit Dr Jeannie Ramos NP-TEAMS  Kj Dennis MD  Cell: [phone]  Office: [phone]

## 2024-01-24 ENCOUNTER — TRANSCRIPTION ENCOUNTER (OUTPATIENT)
Age: 89
End: 2024-01-24

## 2024-01-24 DIAGNOSIS — E78.5 HYPERLIPIDEMIA, UNSPECIFIED: ICD-10-CM

## 2024-01-24 DIAGNOSIS — I10 ESSENTIAL (PRIMARY) HYPERTENSION: ICD-10-CM

## 2024-01-24 LAB
ANION GAP SERPL CALC-SCNC: 16 MMOL/L — SIGNIFICANT CHANGE UP (ref 5–17)
BUN SERPL-MCNC: 33 MG/DL — HIGH (ref 7–23)
CALCIUM SERPL-MCNC: 9.3 MG/DL — SIGNIFICANT CHANGE UP (ref 8.4–10.5)
CHLORIDE SERPL-SCNC: 103 MMOL/L — SIGNIFICANT CHANGE UP (ref 96–108)
CO2 SERPL-SCNC: 24 MMOL/L — SIGNIFICANT CHANGE UP (ref 22–31)
CREAT SERPL-MCNC: 1.09 MG/DL — SIGNIFICANT CHANGE UP (ref 0.5–1.3)
EGFR: 48 ML/MIN/1.73M2 — LOW
GLUCOSE SERPL-MCNC: 156 MG/DL — HIGH (ref 70–99)
MAGNESIUM SERPL-MCNC: 2.1 MG/DL — SIGNIFICANT CHANGE UP (ref 1.6–2.6)
POTASSIUM SERPL-MCNC: 4.1 MMOL/L — SIGNIFICANT CHANGE UP (ref 3.5–5.3)
POTASSIUM SERPL-SCNC: 4.1 MMOL/L — SIGNIFICANT CHANGE UP (ref 3.5–5.3)
SODIUM SERPL-SCNC: 143 MMOL/L — SIGNIFICANT CHANGE UP (ref 135–145)

## 2024-01-24 PROCEDURE — 93356 MYOCRD STRAIN IMG SPCKL TRCK: CPT

## 2024-01-24 PROCEDURE — 93306 TTE W/DOPPLER COMPLETE: CPT | Mod: 26

## 2024-01-24 RX ORDER — SODIUM CHLORIDE 9 MG/ML
1000 INJECTION INTRAMUSCULAR; INTRAVENOUS; SUBCUTANEOUS
Refills: 0 | Status: DISCONTINUED | OUTPATIENT
Start: 2024-01-24 | End: 2024-01-25

## 2024-01-24 RX ORDER — SODIUM CHLORIDE 9 MG/ML
500 INJECTION INTRAMUSCULAR; INTRAVENOUS; SUBCUTANEOUS
Refills: 0 | Status: ACTIVE | OUTPATIENT
Start: 2024-01-24 | End: 2024-01-24

## 2024-01-24 RX ADMIN — Medication 50 MILLIGRAM(S): at 05:55

## 2024-01-24 RX ADMIN — SODIUM CHLORIDE 100 MILLILITER(S): 9 INJECTION INTRAMUSCULAR; INTRAVENOUS; SUBCUTANEOUS at 13:24

## 2024-01-24 RX ADMIN — SODIUM CHLORIDE 75 MILLILITER(S): 9 INJECTION INTRAMUSCULAR; INTRAVENOUS; SUBCUTANEOUS at 20:52

## 2024-01-24 RX ADMIN — AMLODIPINE BESYLATE 10 MILLIGRAM(S): 2.5 TABLET ORAL at 05:56

## 2024-01-24 RX ADMIN — ATORVASTATIN CALCIUM 10 MILLIGRAM(S): 80 TABLET, FILM COATED ORAL at 21:05

## 2024-01-24 RX ADMIN — Medication 1 DROP(S): at 16:23

## 2024-01-24 NOTE — DISCHARGE NOTE PROVIDER - PROVIDER TOKENS
PROVIDER:[TOKEN:[[id number]],FOLLOWUP:[1 week]],PROVIDER:[TOKEN:[[id number]],FOLLOWUP:[1 month]]

## 2024-01-24 NOTE — DISCHARGE NOTE PROVIDER - NSCORESITESY/N_GEN_A_CORE_RD
Overall doing well postoperatively staples still in place there is likely peritoneal fluid from the abdominal incision which is consistent with a small fascial dehiscence and ascites, no obvious herniation either on physical exam or the CT scan there is no signs of infection on physical exam, or CT. Recommend ostomy appliance to control drainage and this should close with time. This should not hold up discharge.
postop popliteal to peroneal bypass foot doing well
resolved
No

## 2024-01-24 NOTE — DISCHARGE NOTE NURSING/CASE MANAGEMENT/SOCIAL WORK - PATIENT PORTAL LINK FT
You can access the FollowMyHealth Patient Portal offered by [facility] by registering at the following website: [email or web address]. By joining [employer]’s FollowMyHealth portal, you will also be able to view your health information using other applications (apps) compatible with our system.

## 2024-01-24 NOTE — DISCHARGE NOTE PROVIDER - NSDCMRMEDTOKEN_GEN_ALL_CORE_FT
Home PT: n/a  irbesartan 150 mg oral tablet: 1 tab(s) orally once a day  metoprolol succinate 50 mg oral tablet, extended release: 1 tab(s) orally once a day  pravastatin 40 mg oral tablet: 1 tab(s) orally once a day (at bedtime)  prednisoLONE acetate 1% ophthalmic suspension: 1 drop(s) in the right eye 3 times a day until 1/24/24  prednisoLONE acetate 1% ophthalmic suspension: 1 drop(s) in the right eye 2 times a day from 1/25/24 to 2/7/24  Prolensa 0.07% ophthalmic solution: 1 drop(s) in the right eye once a day until 2/10/24   cyclobenzaprine 5 mg oral tablet: 1 tab(s) orally 3 times a day as needed for Muscle Spasm MDD: 3  Home PT: n/a  memantine 5 mg oral tablet: 1 tab(s) orally 2 times a day MDD: 2  metoprolol succinate 50 mg oral tablet, extended release: 1 tab(s) orally once a day  pravastatin 40 mg oral tablet: 1 tab(s) orally once a day (at bedtime)  prednisoLONE acetate 1% ophthalmic suspension: 1 drop(s) in the right eye 2 times a day from 1/25/24 to 2/7/24  Prolensa 0.07% ophthalmic solution: 1 drop(s) in the right eye once a day until 2/10/24   clonazePAM 0.25 mg oral tablet, disintegratin tab(s) orally every 8 hours MDD: 3  cyclobenzaprine 5 mg oral tablet: 1 tab(s) orally 3 times a day as needed for Muscle Spasm MDD: 3  Home PT: n/a  memantine 5 mg oral tablet: 1 tab(s) orally 2 times a day MDD: 2  metoprolol succinate 50 mg oral tablet, extended release: 1 tab(s) orally once a day  pravastatin 40 mg oral tablet: 1 tab(s) orally once a day (at bedtime)  prednisoLONE acetate 1% ophthalmic suspension: 1 drop(s) in the right eye 2 times a day from 24 to 24  Prolensa 0.07% ophthalmic solution: 1 drop(s) in the right eye once a day until 2/10/24

## 2024-01-24 NOTE — PROGRESS NOTE ADULT - SUBJECTIVE AND OBJECTIVE BOX
Patient is a 89y old  Female who presents with a chief complaint of     SUBJECTIVE / OVERNIGHT EVENTS: ptn is orthostatic, some improvement post 400 cc of NS, but still POSITIVE, will cont NS : 1 liter over 12 hrs.    MEDICATIONS  (STANDING):  atorvastatin 10 milliGRAM(s) Oral at bedtime  enoxaparin Injectable 40 milliGRAM(s) SubCutaneous every 24 hours  ketorolac 0.5% Ophthalmic Solution 1 Drop(s) Right EYE daily  metoprolol succinate ER 50 milliGRAM(s) Oral daily  prednisoLONE acetate 1% Suspension 1 Drop(s) Right EYE two times a day  sodium chloride 0.9%. 500 milliLiter(s) (100 mL/Hr) IV Continuous <Continuous>  sodium chloride 0.9%. 1000 milliLiter(s) (75 mL/Hr) IV Continuous <Continuous>    MEDICATIONS  (PRN):  clonazePAM  Tablet 0.25 milliGRAM(s) Oral every 8 hours PRN anxiety  cyclobenzaprine 5 milliGRAM(s) Oral three times a day PRN Muscle Spasm      Vital Signs Last 24 Hrs  T(F): 98.1 (01-24-24 @ 20:46), Max: 98.4 (01-24-24 @ 04:42)  HR: 88 (01-24-24 @ 20:46) (44 - 88)  BP: 105/62 (01-24-24 @ 20:46) (105/62 - 147/46)  RR: 18 (01-24-24 @ 20:46) (18 - 18)  SpO2: 94% (01-24-24 @ 20:46) (94% - 97%)  Telemetry:   CAPILLARY BLOOD GLUCOSE        I&O's Summary    23 Jan 2024 07:01  -  24 Jan 2024 07:00  --------------------------------------------------------  IN: 380 mL / OUT: 420 mL / NET: -40 mL    24 Jan 2024 07:01  -  24 Jan 2024 22:57  --------------------------------------------------------  IN: 520 mL / OUT: 0 mL / NET: 520 mL        PHYSICAL EXAM:  GENERAL: NAD, well-developed  HEAD:  Atraumatic, Normocephalic  EYES: EOMI, PERRLA, conjunctiva and sclera clear  NECK: Supple, No JVD  CHEST/LUNG: Clear to auscultation bilaterally; No wheeze  HEART: Regular rate and rhythm; No murmurs, rubs, or gallops  ABDOMEN: Soft, Nontender, Nondistended; Bowel sounds present  EXTREMITIES:  2+ Peripheral Pulses, No clubbing, cyanosis, or edema  PSYCH: AAOx3  NEUROLOGY: non-focal  SKIN: No rashes or lesions    LABS:    01-24    143  |  103  |  33<H>  ----------------------------<  156<H>  4.1   |  24  |  1.09    Ca    9.3      24 Jan 2024 14:27  Mg     2.1     01-24            Urinalysis Basic - ( 24 Jan 2024 14:27 )    Color: x / Appearance: x / SG: x / pH: x  Gluc: 156 mg/dL / Ketone: x  / Bili: x / Urobili: x   Blood: x / Protein: x / Nitrite: x   Leuk Esterase: x / RBC: x / WBC x   Sq Epi: x / Non Sq Epi: x / Bacteria: x        RADIOLOGY & ADDITIONAL TESTS:    Imaging Personally Reviewed:    Consultant(s) Notes Reviewed:      Care Discussed with Consultants/Other Providers:

## 2024-01-24 NOTE — DISCHARGE NOTE PROVIDER - NSDCCPCAREPLAN_GEN_ALL_CORE_FT
PRINCIPAL DISCHARGE DIAGNOSIS  Diagnosis: Laceration of head  Assessment and Plan of Treatment:      PRINCIPAL DISCHARGE DIAGNOSIS  Diagnosis: Laceration of head  Assessment and Plan of Treatment: monitor for complete healing , and suture removal      SECONDARY DISCHARGE DIAGNOSES  Diagnosis: Thyroid nodule  Assessment and Plan of Treatment: ·  Problem: Thyroid nodule.   ·  Plan: Subclinical  Suggest to repeat thyroid function test in 4-6 weeks. Outpatient follow up.   Outpatient Thyroid US for surveillance follow up.      Diagnosis: HTN (hypertension)  Assessment and Plan of Treatment: Follow up with your medical doctor to establish long term blood pressure treatment goals.      Diagnosis: Fall  Assessment and Plan of Treatment: Causes of fall may be due to illness, change in the medicines you take, unsafe or unfamiliar setting and conditions that affect eyesight, hearing, muscle strength, or balance.                                                            Certain medicines used for sleep, anxiety, or depression can cause falls. Adding new medicines, or changing doses of some medicines, can also affect your risk of falling. Your doctor might switch you to a different medicine.                                        Prevent falls by make your home safer – To avoid falling at home, get rid of things that might make you trip or slip. This might include furniture, electrical cords, clutter, and loose rugs. Keep your home well lit to avoid falls or accidents. Avoid storing things in high places so you don't have to reach or climb.                             Wear sturdy shoes that fit well – Wearing shoes with high heels or slippery soles, or shoes that are too loose, can lead to falls.                                                                                                                       Take vitamin D pills which might lower the risk of falls in older people. This is because vitamin D helps make bones and muscles stronger.                                                                                                                   Use a cane, walker, and other safety devices as these devices help you avoid falling, too. These include grab bars or a sturdy seat for the shower, non-slip bath mats, and hand rails or treads for the stairs (to prevent slipping). If you worry that you could fall, there are also alarm buttons that let you call for help if you fall and can't get up.   It is important to tell your doctor about any times you have fallen or almost fallen.  Seek immediate help for pain or injury after a fall.

## 2024-01-24 NOTE — DISCHARGE NOTE PROVIDER - DETAILS OF MALNUTRITION DIAGNOSIS/DIAGNOSES
This patient has been assessed with a concern for Malnutrition and was treated during this hospitalization for the following Nutrition diagnosis/diagnoses:     -  01/25/2024: Severe protein-calorie malnutrition

## 2024-01-24 NOTE — DISCHARGE NOTE NURSING/CASE MANAGEMENT/SOCIAL WORK - NSPROMEDSBROUGHTTOHOSP_GEN_A_NUR
PROGRESS NOTE  Date of Service:  6/7/2022    SUBJECTIVE:  Patient ID: Michelle Thorpe is a 79 y.o. female    HPI:     Previously diagnosed with ulcerative colitis. Later diagnosed with colon cancer. Had temp colostomy at her colectomy then reversal. Does have incontinence and wears a pad for stool leakage. She has been using lomotil several times a day. She also has had constant nausea. EGD 2020 was neg. Using lomotil qid most days   has nausea 75 % of the time  When she takes phenergan she does find relief    Diabetes mellitus-readings in the 150 highest, recent increased after travel  Side effects with Metformin even extended release version  Follows with [facility] for eye exam-   invokana effective but too$  On statin  On ACEI  Denies neuropathy sx    hyperlipidemia- on statin without difficulty     Hypertension-controlled on lisinopril, no chest pain, sob, occasional cough but from PND    GERD- on prilosec 40 mg. She has no symptoms while on this of GERD. She does continue to have the nausea at times- occurs more at night    Depression-on Effexor 150 mg XR and symptoms are well controlled at this time    Patient's medications, allergies, past medical, surgical, social and family histories were reviewed and updated as appropriate. Review of Systems   Constitutional: Positive for fatigue. Unsure if snoring history -she lives alone, does not wake up refreshed, daytime sleepiness occurs regularly especially when seated  Wakes often in the night       OBJECTIVE:  Vitals:    06/07/22 1315   BP: 132/84   Site: Left Upper Arm   Position: Sitting   Cuff Size: Large Adult   Pulse: 87   Resp: 18   Temp: (!) 95.6 °F (35.3 °C)   TempSrc: Temporal   SpO2: 98%   Weight: 217 lb (98.4 kg)   Height: 5' 3\" (1.6 m)      Body mass index is 38.44 kg/m². Physical Exam  Constitutional:       Appearance: Normal appearance. HENT:      Head: Normocephalic and atraumatic. Eyes:      General: No scleral icterus. Conjunctiva/sclera: Conjunctivae normal.   Neck:      Thyroid: No thyroid mass, thyromegaly or thyroid tenderness. Cardiovascular:      Rate and Rhythm: Normal rate and regular rhythm. Heart sounds: Normal heart sounds. Pulmonary:      Breath sounds: Normal breath sounds. No wheezing. Lymphadenopathy:      Cervical: No cervical adenopathy. Neurological:      General: No focal deficit present. Mental Status: She is alert and oriented to person, place, and time. Psychiatric:         Attention and Perception: Attention and perception normal.         Mood and Affect: Mood and affect normal.         Speech: Speech normal.         Behavior: Behavior normal. Behavior is cooperative. Thought Content: Thought content normal.         Cognition and Memory: Cognition and memory normal.         Judgment: Judgment normal.         ASSESSMENT:  1. Hypertension associated with diabetes ([address]    2. Chronic diarrhea    3. Type 2 diabetes mellitus without complication, without long-term current use of insulin ([address]    4. Hyperlipidemia associated with type 2 diabetes mellitus ([address]    5. Excessive daytime sleepiness    6. Malabsorption due to intolerance, not elsewhere classified    7. Nausea    8. Mild episode of recurrent major depressive disorder ([address]    9. Gastroesophageal reflux disease without esophagitis    10. Severe obesity (BMI 35.0-39. 9) with comorbidity ([address]         PLAN:   1. Hypertension associated with diabetes ([address]  Assessment & Plan:  Blood pressure is well controlled. Continue medication regimen. Recommend home blood pressure monitoring. Recommend low sodium diet, at least 150 minutes of cardiovascular exercise each week. Recommend weight loss. 2. Chronic diarrhea  Assessment & Plan:  Continued symptoms but improved with medication  Orders:  -     diphenoxylate-atropine (DIPHENATOL) 2.5-0.025 MG per tablet;  Take 2 tablets by mouth 4 times daily as needed for Diarrhea for up to 30 days., Disp-240 tablet, R-0Normal  -     diphenoxylate-atropine (DIPHENATOL) 2.5-0.025 MG per tablet; Take 2 tablets by mouth 4 times daily as needed for Diarrhea for up to 30 days. , Disp-240 tablet, R-0Normal  -     diphenoxylate-atropine (LOMOTIL) 2.5-0.025 MG per tablet; Take 2 tablets by mouth 4 times daily as needed for Diarrhea for up to 30 days. , Disp-240 tablet, R-0Normal  3. Type 2 diabetes mellitus without complication, without long-term current use of insulin ([facility]  Assessment & Plan:  Overall well controlled. Continue nutrition changes and increase exercise working towards weight loss  Orders:  -     POCT microalbumin  -     POCT glycosylated hemoglobin (Hb A1C)  4. Hyperlipidemia associated with type 2 diabetes mellitus ([facility]  Assessment & Plan:  Well-controlled on current statin dosing  5. Excessive daytime sleepiness  -     Gisell Pulido MD, [facility]  6. Malabsorption due to intolerance, not elsewhere classified  -     diphenoxylate-atropine (DIPHENATOL) 2.5-0.025 MG per tablet; Take 2 tablets by mouth 4 times daily as needed for Diarrhea for up to 30 days. , Disp-240 tablet, R-0Normal  -     diphenoxylate-atropine (DIPHENATOL) 2.5-0.025 MG per tablet; Take 2 tablets by mouth 4 times daily as needed for Diarrhea for up to 30 days. , Disp-240 tablet, R-0Normal  -     diphenoxylate-atropine (LOMOTIL) 2.5-0.025 MG per tablet; Take 2 tablets by mouth 4 times daily as needed for Diarrhea for up to 30 days. , Disp-240 tablet, R-0Normal  7. Nausea  Assessment & Plan:  Stable with as needed dosing    Orders:  -     promethazine (PHENERGAN) 12.5 mg tablet; Take 1 tablet by mouth every 8 hours as needed for Nausea, Disp-30 tablet, R-0Normal  8. Mild episode of recurrent major depressive disorder ([facility]  Assessment & Plan:  Symptoms improved overall.   Continue current medications  Orders:  -     venlafaxine (EFFEXOR XR) 75 MG extended release capsule; TAKE 3 CAPSULES BY MOUTH EVERY DAY, Disp-270 capsule, R-1Normal  9. Gastroesophageal reflux disease without esophagitis  Assessment & Plan:  Stable on current dosing. Determine and eliminate any food or beverage triggers; limit size of meals, limit eating within 3 hours of bed, elevate head of bed; weight loss. 10. Severe obesity (BMI 35.0-39. 9) with comorbidity ([address]       Return in about 3 months (around 8/31/2022). Electronically signed by Christina Shankar MD on 6/7/2022 at 1:40 PM.    Please note this chart was generated using dragon dictation software. Although every effort was made to ensure the accuracy of this automated transcription, some errors in transcription may have occurred. yes

## 2024-01-24 NOTE — PHYSICAL THERAPY INITIAL EVALUATION ADULT - ADDITIONAL COMMENTS
[facility] with [place] [facility], elevator access, IND with RW at baseline, reports can upgrade to get incr assist upon d/c

## 2024-01-24 NOTE — DISCHARGE NOTE PROVIDER - HOSPITAL COURSE
HPI:  89-year-old female with past medical history of HTN, hypercholesterolemia in assisted living presents to the emergency department after a fall when she was coming out of the bathroom, she is supposed to use a walker, she was not using a walker, the glasses got impacted on the left side of her forehead.  Patient had recent cataract surgery of the right eye presently on prednisolone drops for that.  She is also followed by ophthalmology for left-sided macular degeneration. the patient had to crawl to the bed to ask for assistance. Has a laceration of the left side of the forehead. 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 (22 Jan 2024 23:14)    Hospital Course: 89-year-old female with past medical history of HTN, hypercholesterolemia in assisted living presents to the emergency department after a fall when she was coming out of the bathroom, she is supposed to use a walker, she was not using a walker, the glasses got impacted on the left side of her forehead.  Patient had recent cataract surgery of the right eye presently on prednisolone drops for that.  She is also followed by ophthalmology for left-sided macular degeneration. the patient had to crawl to the bed to ask for assistance. Has a laceration of the left side of the forehead. 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    ptn is unable to safely ambulate  BP has improved in supine position but ptn is orthostatic, some improvement post 400 cc of NS, but still POSITIVE, will cont NS : 1 liter over 12 hrs.  cont toprol 50; seen by cardiology. no events on tele anis benign  low tsh, ENDO veal done, cont to monitor; had an echocardiogram which was negative  pt eval and recommended home with PT ,has a Rolling walker  cont outptn meds. Patient is medically cleared for discharge.        Important Medication Changes and Reason:    Active or Pending Issues Requiring Follow-up: none    Advanced Directives:   [ x] Full code  [ ] DNR  [ ] Hospice    Discharge Diagnoses:  s/p fall  Orthostatic

## 2024-01-24 NOTE — DISCHARGE NOTE NURSING/CASE MANAGEMENT/SOCIAL WORK - NSDCFUADDAPPT_GEN_ALL_CORE_FT
APPTS ARE READY TO BE MADE: [ ] YES    Best Family or Patient Contact (if needed):    Additional Information about above appointments (if needed):    1: pcp  2: endocrinology  3:   Follow up with PMD regarding restarting Irbesartan if warranted.   Other comments or requests:

## 2024-01-24 NOTE — DISCHARGE NOTE PROVIDER - NSDCFUADDAPPT_GEN_ALL_CORE_FT
APPTS ARE READY TO BE MADE: [ ] YES    Best Family or Patient Contact (if needed):    Additional Information about above appointments (if needed):    1: pcp  2: endocrinology  3:     Other comments or requests:  APPTS ARE READY TO BE MADE: [ ] YES    Best Family or Patient Contact (if needed):    Additional Information about above appointments (if needed):    1: pcp  2: endocrinology  3:   Follow up with PMD regarding restarting Irbesartan if warranted.   Other comments or requests:

## 2024-01-24 NOTE — PROGRESS NOTE ADULT - ASSESSMENT
89-year-old female with past medical history of HTN, hypercholesterolemia in assisted living presents to the emergency department after a fall     Assessment:  ?Hyperthyroid: No HX of thyroid dz, Known thyroid nodules from 2022,  not on thyroid supplements,  found to have suppressed TSH, Free  thyroxine 1.2 normal. Subclinical.   Thyroid US 2022: several R lobe nodules  HTN: on antihypertensive medications, monitored, asymptomatic.    Discussed plan and management wit Dr Jeannie Ramos NP-TEAMS  Kj Dennis MD  Cell: [phone]  Office: [phone]             89-year-old female with past medical history of HTN, hypercholesterolemia in assisted living presents to the emergency department after a fall     Assessment:  ?Hyperthyroid: No HX of thyroid dz, Known thyroid nodules from 2022,  not on thyroid supplements,  found to have suppressed TSH, Free  thyroxine 1.2  normal. Subclinical.   Thyroid US 2022: several R lobe nodules  HTN: on antihypertensive medications, monitored, asymptomatic.    Discussed plan and management wit Dr Jeannie Ramos NP-TEAMS  Kj Dennis MD  Cell: [phone]  Office: [phone]             normal (ped)...

## 2024-01-24 NOTE — PROGRESS NOTE ADULT - ASSESSMENT
89-year-old female with past medical history of HTN, hypercholesterolemia in assisted living presents to the emergency department after a fall when she was coming out of the bathroom, she is supposed to use a walker, she was not using a walker, the glasses got impacted on the left side of her forehead.  Patient had recent cataract surgery of the right eye presently on prednisolone drops for that.  She is also followed by ophthalmology for left-sided macular degeneration. the patient had to crawl to the bed to ask for assistance. Has a laceration of the left side of the forehead. 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    ptn is unable to safely ambulate  BP has improved in supine position but ptn is orthostatic, some improvement post 400 cc of NS, but still POSITIVE, will cont NS : 1 liter over 12 hrs.  cont toprol 50   seen by card. no events on tele. TTE is benign  low tsh, ENDO veal done, cont to monitor  pt eval  cont outptn meds  DVT ppx w sc Lovenox

## 2024-01-24 NOTE — DISCHARGE NOTE PROVIDER - CARE PROVIDERS DIRECT ADDRESSES
[email or web address]

## 2024-01-24 NOTE — PROGRESS NOTE ADULT - SUBJECTIVE AND OBJECTIVE BOX
Chief complaint  Patient is a 89y old  Female who presents with a chief complaint of        Labs and Fingersticks  CAPILLARY BLOOD GLUCOSE                            Medications  MEDICATIONS  (STANDING):  atorvastatin 10 milliGRAM(s) Oral at bedtime  enoxaparin Injectable 40 milliGRAM(s) SubCutaneous every 24 hours  ketorolac 0.5% Ophthalmic Solution 1 Drop(s) Right EYE daily  metoprolol succinate ER 50 milliGRAM(s) Oral daily  prednisoLONE acetate 1% Suspension 1 Drop(s) Right EYE two times a day  sodium chloride 0.9%. 500 milliLiter(s) (100 mL/Hr) IV Continuous <Continuous>      Physical Exam  General: Patient comfortable in bed   Vital Signs Last 12 Hrs  T(F): 98.4 (01-24-24 @ 04:42), Max: 98.4 (01-24-24 @ 04:42)  HR: 85 (01-24-24 @ 08:55) (44 - 85)  BP: 147/46 (01-24-24 @ 08:55) (137/71 - 147/46)  BP(mean): --  RR: 18 (01-24-24 @ 04:42) (18 - 18)  SpO2: 95% (01-24-24 @ 08:55) (95% - 97%)    CVS: S1S2   Respiratory: No wheezing, no crepitations  GI: Abdomen soft, bowel sounds positive  Musculoskeletal:  moves all extremities  : Voiding          Chief complaint  Patient is a 89y old  Female who presents with a chief complaint of        Labs and Fingersticks  CAPILLARY BLOOD GLUCOSE      Medications  MEDICATIONS  (STANDING):  atorvastatin 10 milliGRAM(s) Oral at bedtime  enoxaparin Injectable 40 milliGRAM(s) SubCutaneous every 24 hours  ketorolac 0.5% Ophthalmic Solution 1 Drop(s) Right EYE daily  metoprolol succinate ER 50 milliGRAM(s) Oral daily  prednisoLONE acetate 1% Suspension 1 Drop(s) Right EYE two times a day  sodium chloride 0.9%. 500 milliLiter(s) (100 mL/Hr) IV Continuous <Continuous>      Physical Exam  General: Patient comfortable in bed   Vital Signs Last 12 Hrs    T(F): 98.4 (01-24-24 @ 04:42), Max: 98.4 (01-24-24 @ 04:42)  HR: 85 (01-24-24 @ 08:55) (44 - 85)  BP: 147/46 (01-24-24 @ 08:55) (137/71 - 147/46)  BP(mean): --  RR: 18 (01-24-24 @ 04:42) (18 - 18)  SpO2: 95% (01-24-24 @ 08:55) (95% - 97%)    CVS: S1S2   Respiratory: No wheezing, no crepitations  GI: Abdomen soft, bowel sounds positive  Musculoskeletal:  moves all extremities  : Voiding

## 2024-01-24 NOTE — PROVIDER CONTACT NOTE (OTHER) - REASON
----- Message from MAHSA Bledsoe sent at 2/26/2020  8:25 AM CST -----  Labs are normal, we will call when her stool studies are available   Informed by tele tech that patient experienced PATs for 2.1 secs up to 130s.

## 2024-01-24 NOTE — DISCHARGE NOTE PROVIDER - CARE PROVIDER_API CALL
Maria E Ramirez  [facility]  [address]  [place] [address]  Phone: [phone]  Fax: [phone]  Follow Up Time: 1 week    Kj Dennis)  Endocrinology/Metab/Diabetes  [address] [place] [address]  Phone: [phone]  Fax: [phone]  Follow Up Time: 1 month

## 2024-01-25 RX ORDER — LANOLIN ALCOHOL/MO/W.PET/CERES
3 CREAM (GRAM) TOPICAL AT BEDTIME
Refills: 0 | Status: DISCONTINUED | OUTPATIENT
Start: 2024-01-25 | End: 2024-01-26

## 2024-01-25 RX ORDER — POLYETHYLENE GLYCOL 3350 17 G/17G
17 POWDER, FOR SOLUTION ORAL DAILY
Refills: 0 | Status: DISCONTINUED | OUTPATIENT
Start: 2024-01-25 | End: 2024-01-26

## 2024-01-25 RX ORDER — SENNA PLUS 8.6 MG/1
2 TABLET ORAL AT BEDTIME
Refills: 0 | Status: DISCONTINUED | OUTPATIENT
Start: 2024-01-25 | End: 2024-01-26

## 2024-01-25 RX ORDER — MEMANTINE HYDROCHLORIDE 10 MG/1
5 TABLET ORAL
Refills: 0 | Status: DISCONTINUED | OUTPATIENT
Start: 2024-01-25 | End: 2024-01-26

## 2024-01-25 RX ADMIN — ATORVASTATIN CALCIUM 10 MILLIGRAM(S): 80 TABLET, FILM COATED ORAL at 21:20

## 2024-01-25 RX ADMIN — Medication 1 DROP(S): at 17:56

## 2024-01-25 RX ADMIN — ENOXAPARIN SODIUM 40 MILLIGRAM(S): 100 INJECTION SUBCUTANEOUS at 21:21

## 2024-01-25 RX ADMIN — Medication 10 MILLIGRAM(S): at 13:39

## 2024-01-25 RX ADMIN — Medication 1 DROP(S): at 07:50

## 2024-01-25 RX ADMIN — Medication 50 MILLIGRAM(S): at 06:00

## 2024-01-25 RX ADMIN — Medication 0.25 MILLIGRAM(S): at 22:36

## 2024-01-25 RX ADMIN — POLYETHYLENE GLYCOL 3350 17 GRAM(S): 17 POWDER, FOR SOLUTION ORAL at 11:54

## 2024-01-25 RX ADMIN — SENNA PLUS 2 TABLET(S): 8.6 TABLET ORAL at 21:21

## 2024-01-25 NOTE — DIETITIAN INITIAL EVALUATION ADULT - NSFNSGIIOFT_GEN_A_CORE
I&O's Detail    24 Jan 2024 07:01  -  25 Jan 2024 07:00  --------------------------------------------------------  IN:    Oral Fluid: 640 mL  Total IN: 640 mL    OUT:    Voided (mL): 750 mL  Total OUT: 750 mL    Total NET: -110 mL      25 Jan 2024 07:01  -  25 Jan 2024 14:45  --------------------------------------------------------  IN:    Oral Fluid: 120 mL  Total IN: 120 mL    OUT:  Total OUT: 0 mL    Total NET: 120 mL

## 2024-01-25 NOTE — DIETITIAN INITIAL EVALUATION ADULT - NAME AND PHONE
Pt resting in bed with eyes closed, NAD noted, no more nausea or emesis. Pt states that the medicine that he got (ativan) made him feel much better   Rosanna Latham, SNEHA - available on [place] TEAMS

## 2024-01-25 NOTE — PROGRESS NOTE ADULT - SUBJECTIVE AND OBJECTIVE BOX
Chief complaint  Patient is a 89y old  Female who presents with a chief complaint of Syncope (25 Jan 2024 10:47)         Labs and Fingersticks  CAPILLARY BLOOD GLUCOSE          Anion Gap: 16 (01-24 @ 14:27)      Calcium: 9.3 (01-24 @ 14:27)          01-24    143  |  103  |  33<H>  ----------------------------<  156<H>  4.1   |  24  |  1.09    Ca    9.3      24 Jan 2024 14:27  Mg     2.1     01-24      Medications  MEDICATIONS  (STANDING):  atorvastatin 10 milliGRAM(s) Oral at bedtime  enoxaparin Injectable 40 milliGRAM(s) SubCutaneous every 24 hours  ketorolac 0.5% Ophthalmic Solution 1 Drop(s) Right EYE daily  metoprolol succinate ER 50 milliGRAM(s) Oral daily  polyethylene glycol 3350 17 Gram(s) Oral daily  prednisoLONE acetate 1% Suspension 1 Drop(s) Right EYE two times a day  senna 2 Tablet(s) Oral at bedtime      Physical Exam  General: Patient comfortable in bed  Vital Signs Last 12 Hrs  T(F): 97.9 (01-25-24 @ 11:44), Max: 97.9 (01-25-24 @ 11:44)  HR: 70 (01-25-24 @ 08:13) (70 - 78)  BP: 162/66 (01-25-24 @ 08:13) (162/66 - 177/80)  BP(mean): --  RR: 18 (01-25-24 @ 11:44) (18 - 18)  SpO2: 98% (01-25-24 @ 11:44) (97% - 98%)    CVS: S1S2   Respiratory: No wheezing, no crepitations  GI: Abdomen soft, bowel sounds positive  Musculoskeletal:  moves all extremities  : Voiding        Chief complaint  Patient is a 89y old  Female who presents with a chief complaint of Syncope (25 Jan 2024 10:47)         Labs and Fingersticks  CAPILLARY BLOOD GLUCOSE          Anion Gap: 16 (01-24 @ 14:27)      Calcium: 9.3 (01-24 @ 14:27)          01-24    143  |  103  |  33<H>  ----------------------------<  156<H>  4.1   |  24  |  1.09    Ca    9.3      24 Jan 2024 14:27  Mg     2.1     01-24      Medications  MEDICATIONS  (STANDING):  atorvastatin 10 milliGRAM(s) Oral at bedtime  enoxaparin Injectable 40 milliGRAM(s) SubCutaneous every 24 hours  ketorolac 0.5% Ophthalmic Solution 1 Drop(s) Right EYE daily  metoprolol succinate ER 50 milliGRAM(s) Oral daily  polyethylene glycol 3350 17 Gram(s) Oral daily  prednisoLONE acetate 1% Suspension 1 Drop(s) Right EYE two times a day  senna 2 Tablet(s) Oral at bedtime      Physical Exam  General: Patient comfortable in bed  Vital Signs Last 12 Hrs  T(F): 97.9 (01-25-24 @ 11:44), Max: 97.9 (01-25-24 @ 11:44)  HR: 70 (01-25-24 @ 08:13) (70 - 78)  BP: 162/66 (01-25-24 @ 08:13) (162/66 - 177/80)  BP(mean): --  RR: 18 (01-25-24 @ 11:44) (18 - 18)  SpO2: 98% (01-25-24 @ 11:44) (97% - 98%)    CVS: S1S2   Respiratory: No wheezing, no crepitations  GI: Abdomen soft, bowel sounds positive  Musculoskeletal:  moves all extremities  : Voiding

## 2024-01-25 NOTE — DIETITIAN INITIAL EVALUATION ADULT - PERTINENT LABORATORY DATA
01-24    143  |  103  |  33<H>  ----------------------------<  156<H>  4.1   |  24  |  1.09    Ca    9.3      24 Jan 2024 14:27  Mg     2.1     01-24

## 2024-01-25 NOTE — PROGRESS NOTE ADULT - SUBJECTIVE AND OBJECTIVE BOX
Patient is a 89y old  Female who presents with a chief complaint of Laceration without foreign body of unspecified part of head, initial encounter     (25 Jan 2024 14:35)      SUBJECTIVE / OVERNIGHT EVENTS: ptn is euvolemic post 1.5 liters NS, she remains orthostatic 2/2 dysautonomia. HTN    MEDICATIONS  (STANDING):  atorvastatin 10 milliGRAM(s) Oral at bedtime  enoxaparin Injectable 40 milliGRAM(s) SubCutaneous every 24 hours  ketorolac 0.5% Ophthalmic Solution 1 Drop(s) Right EYE daily  metoprolol succinate ER 50 milliGRAM(s) Oral daily  polyethylene glycol 3350 17 Gram(s) Oral daily  prednisoLONE acetate 1% Suspension 1 Drop(s) Right EYE two times a day  senna 2 Tablet(s) Oral at bedtime    MEDICATIONS  (PRN):  clonazePAM  Tablet 0.25 milliGRAM(s) Oral every 8 hours PRN anxiety  cyclobenzaprine 5 milliGRAM(s) Oral three times a day PRN Muscle Spasm      Vital Signs Last 24 Hrs  T(F): 97.9 (01-25-24 @ 21:32), Max: 97.9 (01-25-24 @ 11:44)  HR: 83 (01-25-24 @ 21:32) (70 - 83)  BP: 146/60 (01-25-24 @ 21:32) (146/60 - 177/80)  RR: 18 (01-25-24 @ 21:32) (18 - 18)  SpO2: 96% (01-25-24 @ 21:32) (96% - 98%)  Telemetry:   CAPILLARY BLOOD GLUCOSE        I&O's Summary    24 Jan 2024 07:01  -  25 Jan 2024 07:00  --------------------------------------------------------  IN: 640 mL / OUT: 750 mL / NET: -110 mL    25 Jan 2024 07:01  -  25 Jan 2024 22:28  --------------------------------------------------------  IN: 560 mL / OUT: 450 mL / NET: 110 mL        PHYSICAL EXAM:  GENERAL: NAD, well-developed  HEAD:  Atraumatic, Normocephalic  EYES: EOMI, PERRLA, conjunctiva and sclera clear  NECK: Supple, No JVD  CHEST/LUNG: Clear to auscultation bilaterally; No wheeze  HEART: Regular rate and rhythm; No murmurs, rubs, or gallops  ABDOMEN: Soft, Nontender, Nondistended; Bowel sounds present  EXTREMITIES:  2+ Peripheral Pulses, No clubbing, cyanosis, or edema  PSYCH: AAOx3  NEUROLOGY: non-focal  SKIN: No rashes or lesions    LABS:    01-24    143  |  103  |  33<H>  ----------------------------<  156<H>  4.1   |  24  |  1.09    Ca    9.3      24 Jan 2024 14:27  Mg     2.1     01-24            Urinalysis Basic - ( 24 Jan 2024 14:27 )    Color: x / Appearance: x / SG: x / pH: x  Gluc: 156 mg/dL / Ketone: x  / Bili: x / Urobili: x   Blood: x / Protein: x / Nitrite: x   Leuk Esterase: x / RBC: x / WBC x   Sq Epi: x / Non Sq Epi: x / Bacteria: x        RADIOLOGY & ADDITIONAL TESTS:    Imaging Personally Reviewed:    Consultant(s) Notes Reviewed:      Care Discussed with Consultants/Other Providers:

## 2024-01-25 NOTE — PROGRESS NOTE ADULT - SUBJECTIVE AND OBJECTIVE BOX
SUBJECTIVE:  NO CP no SOB. Good spirits this AM    MEDICATIONS:  metoprolol succinate ER 50 milliGRAM(s) Oral daily  ClonazePAM  Tablet 0.25 milliGRAM(s) Oral every 8 hours PRN  cyclobenzaprine 5 milliGRAM(s) Oral three times a day PRN      atorvastatin 10 milliGRAM(s) Oral at bedtime    enoxaparin Injectable 40 milliGRAM(s) SubCutaneous every 24 hours  ketorolac 0.5% Ophthalmic Solution 1 Drop(s) Right EYE daily  prednisoLONE acetate 1% Suspension 1 Drop(s) Right EYE two times a day  sodium chloride 0.9%. 500 milliLiter(s) IV Continuous <Continuous>  sodium chloride 0.9%. 1000 milliLiter(s) IV Continuous <Continuous>      REVIEW OF SYSTEMS:    CONSTITUTIONAL: No fever, weight loss, or fatigue  EYES: No eye pain, visual disturbances, or discharge  NECK: No pain or stiffness  RESPIRATORY: No cough, wheezing, chills or hemoptysis; No Shortness of Breath  CARDIOVASCULAR: No chest pain, palpitations, dizziness, or leg swelling  GASTROINTESTINAL: No abdominal or epigastric pain. No nausea, vomiting, or hematemesis; No diarrhea or constipation. No melena or hematochezia.  GENITOURINARY: No dysuria, frequency, hematuria, or incontinence  NEUROLOGICAL: No headaches, memory loss, loss of strength, numbness, or tremors  SKIN: No itching, burning, rashes, or lesions   LYMPH Nodes: No enlarged glands  MUSCULOSKELETAL: No joint pain or swelling; No muscle, back, or extremity pain  All other review of systems are negative.  	  [ ] Unable to obtain    PHYSICAL EXAM:  T(C): 36.3 (01-25-24 @ 04:00), Max: 36.8 (01-24-24 @ 16:16)  HR: 70 (01-25-24 @ 08:13) (70 - 88)  BP: 162/66 (01-25-24 @ 08:13) (105/62 - 177/80)  RR: 18 (01-25-24 @ 04:00) (18 - 18)  SpO2: 97% (01-25-24 @ 04:00) (94% - 97%)  Wt(kg): --  I&O's Summary    24 Jan 2024 07:01  -  25 Jan 2024 07:00  --------------------------------------------------------  IN: 640 mL / OUT: 750 mL / NET: -110 mL          PHYSICAL EXAM    Appearance: Normal	  HEENT:   Normal oral mucosa, PERRL, EOMI	L sided ecchymosis on  face   NECK: Soft and supple, No LAD, No JVD  Cardiovascular: Regular Rate and Rhythm, Normal S1 S2, No murmurs, No clicks, gallops or rubs  Respiratory: Lungs clear to auscultation	  Gastrointestinal:  Soft, Non-tender, + BS	  Skin: No rashes, No ecchymoses, No cyanosis  Neurologic: Non-focal  Extremities: No clubbing, cyanosis or edema  Vascular: Peripheral pulses palpable 2+ bilaterally      LABS:	 	      01-24    143  |  103  |  33<H>  ----------------------------<  156<H>  4.1   |  24  |  1.09    Ca    9.3      24 Jan 2024 14:27  Mg     2.1     01-24

## 2024-01-25 NOTE — DIETITIAN INITIAL EVALUATION ADULT - NS FNS REASON FOR WEIGHT CHANG
Pt reports a UBW of ~135 pounds x1.5 years ago, endorses 10-12 pound wt loss since then, noting she recently weighted herself at 123 pounds.     Daily wts in pounds: 125 (1/24), 125.6 (1/25)  Wt hx per [facility] HIE: 130 pounds (10/17/23)    Noted with 3.8% wt loss x3 months, not clinically significant. RD to continue to monitor wt as able.

## 2024-01-25 NOTE — DIETITIAN INITIAL EVALUATION ADULT - NSFNSGIASSESSMENTFT_GEN_A_CORE
Pt denies N/V and diarrhea, endorses constipation. Reporting last BM PTA. RD to provide prune juice daily. Not currently ordered for a bowel regimen.

## 2024-01-25 NOTE — DIETITIAN INITIAL EVALUATION ADULT - ADD RECOMMEND
1) Recommend liberalizing from DASH diet to low sodium to optimize diet options, defer diet texture/fluid consistency to team   2) Add ensure 1x/day, encourage protein-rich foods, obtain and honor preferences as able   3) Consider adding bowel regimen if no BMs persists  4) Malnutrition sticker placed in chart   5) Monitor nutritional intake, diet tolerance, labs, hydration, GI function, skin integrity and wt trends

## 2024-01-25 NOTE — PROGRESS NOTE ADULT - ASSESSMENT
89-year-old female with past medical history of HTN, hypercholesterolemia in assisted living presents to the emergency department after a fall when she was coming out of the bathroom, she is supposed to use a walker, she was not using a walker, the glasses got impacted on the left side of her forehead.  Patient had recent cataract surgery of the right eye presently on prednisolone drops for that.  She is also followed by ophthalmology for left-sided macular degeneration. the patient had to crawl to the bed to ask for assistance. Has a laceration of the left side of the forehead. 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      ptn is euvolemic post 1.5 liters NS, she remains orthostatic 2/2 dysautonomia. HTN  cont toprol 50  seen by card. no events on tele. TTE is benign  low tsh, ENDO eval done, cont to monitor  pt eval  cont outptn meds  PT eval done, [facility]  DVT ppx w sc Lovenox  will start on Namenda 5 mg bid  [facility] in am

## 2024-01-25 NOTE — PROGRESS NOTE ADULT - ASSESSMENT
89-year-old female + HTN, hypercholesterolemia, lives  in [facility] living presents to the emergency department after a fall when she was coming out of the bathroom, she is supposed to use a walker, she was not using a walker, her  glasses got impacted on the left side of her forehead Has a laceration of the left side of the forehead. Imaging unremarkable. NO clear LOC, claims she is not sure. Last office echo 6/22/23 ef 70%, mild mr, mild ai.   - Improved with hydration  - cont to follow orthostatics  - PT  - mobilize as tolerated   - Echo 1/24/24: Left ventricular cavity is small. Left ventricular wall thickness is normal. Left ventricular systolic function is hyperdynamic. There are no regional wall motion abnormalities seen.  - no cardiac etiology has been discerned. Continue to monitor and continue present care per IM

## 2024-01-25 NOTE — DIETITIAN INITIAL EVALUATION ADULT - ENERGY INTAKE
Poor (<50%) Pt reporting being told her syncope could have been a result of straining to have a BM, therefore pt has had very poor intake in-house due to constipation/fear of syncope due to constipation. Encouraged food, fluid, and fiber intake to reduce constipation, pt amenable to receiving prune juice daily. Also amenable to receiving ensure daily to aid in protein intake. Per flowsheets, pt with 25-75% intake x3 meals.

## 2024-01-25 NOTE — DIETITIAN INITIAL EVALUATION ADULT - PERSON TAUGHT/METHOD
Provided pt with constipation nutrition therapy, encouraged fluid intake as well as fiber-rich foods./verbal instruction/patient instructed

## 2024-01-25 NOTE — DIETITIAN INITIAL EVALUATION ADULT - PERTINENT MEDS FT
MEDICATIONS  (STANDING):  atorvastatin 10 milliGRAM(s) Oral at bedtime  enoxaparin Injectable 40 milliGRAM(s) SubCutaneous every 24 hours  ketorolac 0.5% Ophthalmic Solution 1 Drop(s) Right EYE daily  metoprolol succinate ER 50 milliGRAM(s) Oral daily  polyethylene glycol 3350 17 Gram(s) Oral daily  prednisoLONE acetate 1% Suspension 1 Drop(s) Right EYE two times a day  senna 2 Tablet(s) Oral at bedtime    MEDICATIONS  (PRN):  clonazePAM  Tablet 0.25 milliGRAM(s) Oral every 8 hours PRN anxiety  cyclobenzaprine 5 milliGRAM(s) Oral three times a day PRN Muscle Spasm

## 2024-01-25 NOTE — DIETITIAN INITIAL EVALUATION ADULT - ORAL INTAKE PTA/DIET HISTORY
Pt reporting good typical intake, eating 3 meals/day at the assisted living facility. Does not follow any therapeutic diets at home. Confirms NKFA, denies hx of chewing/swallowing difficulty, reports taking a multivitamin at home.

## 2024-01-25 NOTE — PROGRESS NOTE ADULT - ASSESSMENT
89-year-old female with past medical history of HTN, hypercholesterolemia in assisted living presents to the emergency department after a fall   Assessment:  ?Hyperthyroid: No HX of thyroid dz, Known thyroid nodules from 2022,  not on thyroid supplements,  found to have suppressed TSH, Free  thyroxine 1.2  normal. Subclinical.   Thyroid US 2022: several R lobe nodules  HTN: on antihypertensive medications, monitored, asymptomatic.    Discussed plan and management wit Dr Jeannie Ramos NP-TEAMS  Kj Dennis MD  Cell: [phone]  Office: [phone]             89-year-old female with past medical history of HTN, hypercholesterolemia in assisted living presents to the emergency department after a fall   Assessment:  ?Hyperthyroid: No HX of thyroid dz, Known thyroid nodules from 2022,  not on thyroid supplements,  found to have suppressed  TSH, Free  thyroxine 1.2  normal. Subclinical.   Thyroid US 2022: several R lobe nodules  HTN: on antihypertensive medications, monitored, asymptomatic.    Discussed plan and management wit Dr Jeannie Ramos NP-TEAMS  Kj Dennis MD  Cell: [phone]  Office: [phone]

## 2024-01-26 VITALS
RESPIRATION RATE: 18 BRPM | SYSTOLIC BLOOD PRESSURE: 120 MMHG | TEMPERATURE: 98 F | DIASTOLIC BLOOD PRESSURE: 51 MMHG | HEART RATE: 57 BPM | OXYGEN SATURATION: 93 %

## 2024-01-26 PROCEDURE — 84439 ASSAY OF FREE THYROXINE: CPT

## 2024-01-26 PROCEDURE — 73522 X-RAY EXAM HIPS BI 3-4 VIEWS: CPT

## 2024-01-26 PROCEDURE — 70450 CT HEAD/BRAIN W/O DYE: CPT | Mod: MA

## 2024-01-26 PROCEDURE — 80048 BASIC METABOLIC PNL TOTAL CA: CPT

## 2024-01-26 PROCEDURE — 72125 CT NECK SPINE W/O DYE: CPT | Mod: MA

## 2024-01-26 PROCEDURE — 12011 RPR F/E/E/N/L/M 2.5 CM/<: CPT

## 2024-01-26 PROCEDURE — 36415 COLL VENOUS BLD VENIPUNCTURE: CPT

## 2024-01-26 PROCEDURE — 84436 ASSAY OF TOTAL THYROXINE: CPT

## 2024-01-26 PROCEDURE — 81001 URINALYSIS AUTO W/SCOPE: CPT

## 2024-01-26 PROCEDURE — 85025 COMPLETE CBC W/AUTO DIFF WBC: CPT

## 2024-01-26 PROCEDURE — 93356 MYOCRD STRAIN IMG SPCKL TRCK: CPT

## 2024-01-26 PROCEDURE — 73110 X-RAY EXAM OF WRIST: CPT

## 2024-01-26 PROCEDURE — 83735 ASSAY OF MAGNESIUM: CPT

## 2024-01-26 PROCEDURE — 84484 ASSAY OF TROPONIN QUANT: CPT

## 2024-01-26 PROCEDURE — 85730 THROMBOPLASTIN TIME PARTIAL: CPT

## 2024-01-26 PROCEDURE — 71046 X-RAY EXAM CHEST 2 VIEWS: CPT

## 2024-01-26 PROCEDURE — 70486 CT MAXILLOFACIAL W/O DYE: CPT | Mod: MA

## 2024-01-26 PROCEDURE — 87086 URINE CULTURE/COLONY COUNT: CPT

## 2024-01-26 PROCEDURE — 93306 TTE W/DOPPLER COMPLETE: CPT

## 2024-01-26 PROCEDURE — 85610 PROTHROMBIN TIME: CPT

## 2024-01-26 PROCEDURE — 84480 ASSAY TRIIODOTHYRONINE (T3): CPT

## 2024-01-26 PROCEDURE — 76376 3D RENDER W/INTRP POSTPROCES: CPT

## 2024-01-26 PROCEDURE — 73564 X-RAY EXAM KNEE 4 OR MORE: CPT

## 2024-01-26 PROCEDURE — 84443 ASSAY THYROID STIM HORMONE: CPT

## 2024-01-26 PROCEDURE — 80053 COMPREHEN METABOLIC PANEL: CPT

## 2024-01-26 PROCEDURE — 84100 ASSAY OF PHOSPHORUS: CPT

## 2024-01-26 PROCEDURE — 99285 EMERGENCY DEPT VISIT HI MDM: CPT | Mod: 25

## 2024-01-26 PROCEDURE — 97161 PT EVAL LOW COMPLEX 20 MIN: CPT

## 2024-01-26 PROCEDURE — 87637 SARSCOV2&INF A&B&RSV AMP PRB: CPT

## 2024-01-26 RX ORDER — MEMANTINE HYDROCHLORIDE 10 MG/1
1 TABLET ORAL
Qty: 60 | Refills: 0
Start: 2024-01-26 | End: 2024-02-24

## 2024-01-26 RX ORDER — IRBESARTAN 75 MG/1
1 TABLET ORAL
Refills: 0 | DISCHARGE

## 2024-01-26 RX ORDER — CLONAZEPAM 1 MG
1 TABLET ORAL
Qty: 5 | Refills: 0
Start: 2024-01-26 | End: 2024-02-04

## 2024-01-26 RX ORDER — PREDNISOLONE SODIUM PHOSPHATE 1 %
1 DROPS OPHTHALMIC (EYE)
Refills: 0 | DISCHARGE

## 2024-01-26 RX ORDER — CYCLOBENZAPRINE HYDROCHLORIDE 10 MG/1
1 TABLET, FILM COATED ORAL
Qty: 30 | Refills: 0
Start: 2024-01-26 | End: 2024-02-04

## 2024-01-26 RX ADMIN — Medication 50 MILLIGRAM(S): at 05:15

## 2024-01-26 RX ADMIN — MEMANTINE HYDROCHLORIDE 5 MILLIGRAM(S): 10 TABLET ORAL at 05:16

## 2024-01-26 RX ADMIN — Medication 1 DROP(S): at 05:15

## 2024-01-26 NOTE — PROGRESS NOTE ADULT - SUBJECTIVE AND OBJECTIVE BOX
Patient is a 89y old  Female who presents with a chief complaint of Fall (26 Jan 2024 08:33)      SUBJECTIVE / OVERNIGHT EVENTS: d/w daughter in detail hospital course and outptn plan of care. ptn has dysautonomia, so SBP goal should be 150, will need outptn neuro f/u. seen by neuro and card here. ptn is euvolemic     MEDICATIONS  (STANDING):  atorvastatin 10 milliGRAM(s) Oral at bedtime  enoxaparin Injectable 40 milliGRAM(s) SubCutaneous every 24 hours  ketorolac 0.5% Ophthalmic Solution 1 Drop(s) Right EYE daily  melatonin 3 milliGRAM(s) Oral at bedtime  memantine 5 milliGRAM(s) Oral two times a day  metoprolol succinate ER 50 milliGRAM(s) Oral daily  polyethylene glycol 3350 17 Gram(s) Oral daily  prednisoLONE acetate 1% Suspension 1 Drop(s) Right EYE two times a day  senna 2 Tablet(s) Oral at bedtime    MEDICATIONS  (PRN):  clonazePAM  Tablet 0.25 milliGRAM(s) Oral every 8 hours PRN anxiety  cyclobenzaprine 5 milliGRAM(s) Oral three times a day PRN Muscle Spasm      Vital Signs Last 24 Hrs  T(F): 97.7 (01-26-24 @ 12:02), Max: 98.5 (01-26-24 @ 04:51)  HR: 57 (01-26-24 @ 12:02) (57 - 83)  BP: 120/51 (01-26-24 @ 12:02) (120/51 - 146/60)  RR: 18 (01-26-24 @ 12:02) (18 - 18)  SpO2: 93% (01-26-24 @ 12:02) (93% - 96%)  Telemetry:   CAPILLARY BLOOD GLUCOSE        I&O's Summary    25 Jan 2024 07:01  -  26 Jan 2024 07:00  --------------------------------------------------------  IN: 560 mL / OUT: 450 mL / NET: 110 mL    26 Jan 2024 07:01  -  26 Jan 2024 17:01  --------------------------------------------------------  IN: 200 mL / OUT: 0 mL / NET: 200 mL        PHYSICAL EXAM:  GENERAL: NAD, well-developed  HEAD:  Atraumatic, Normocephalic  EYES: EOMI, PERRLA, conjunctiva and sclera clear  NECK: Supple, No JVD  CHEST/LUNG: Clear to auscultation bilaterally; No wheeze  HEART: Regular rate and rhythm; No murmurs, rubs, or gallops  ABDOMEN: Soft, Nontender, Nondistended; Bowel sounds present  EXTREMITIES:  2+ Peripheral Pulses, No clubbing, cyanosis, or edema  PSYCH: AAOx3  NEUROLOGY: non-focal  SKIN: No rashes or lesions    LABS:                    RADIOLOGY & ADDITIONAL TESTS:    Imaging Personally Reviewed:    Consultant(s) Notes Reviewed:      Care Discussed with Consultants/Other Providers:

## 2024-01-26 NOTE — CONSULT NOTE ADULT - ASSESSMENT
89-year-old female with past medical history of HTN, hypercholesterolemia in assisted living after fall, struck head. Episode of confusion in hospital  O/E non-focal with some cognitive defiicts  CTH C-spine no acute findings  tte reviewed  orthostatic      Impression: fall possible orthostatic syncope, possible underling neurodegenerative process such as AD    B12, folate if possible  outpt cognitive w/u  Can follow up with Neurology, Dr. Adryan Farmer at [phone]

## 2024-01-26 NOTE — PROGRESS NOTE ADULT - ASSESSMENT
89-year-old female with past medical history of HTN, hypercholesterolemia in assisted living presents to the emergency department after a fall   Assessment:  ?Hyperthyroid: No HX of thyroid dz, Known thyroid nodules from 2022,  not on thyroid supplements,  found to have suppressed  TSH, Free  thyroxine 1.2  normal. Subclinical.   Thyroid US 2022: several R lobe nodules  HTN: on antihypertensive medications, monitored, asymptomatic.    Discussed plan and management wit Dr Jeannie Ramos NP-TEAMS  Kj Dennis MD  Cell: [phone]  Office: [phone]             89-year-old female with past medical history of HTN, hypercholesterolemia in assisted living presents to the emergency department after a fall   Assessment:  ?Hyperthyroid: No HX of thyroid dz, Known thyroid nodules from 2022,  not on thyroid supplements,  found to have suppressed  TSH, Free  thyroxine 1.2   normal. Subclinical.   Thyroid US 2022: several R lobe nodules  HTN: on antihypertensive medications, monitored, asymptomatic.    Discussed plan and management wit Dr Jeannie Ramos NP-TEAMS  Kj Dennis MD  Cell: [phone]  Office: [phone]

## 2024-01-26 NOTE — PROGRESS NOTE ADULT - NS ATTEND AMEND GEN_ALL_CORE FT
Chart, labs, vitals, radiology reviewed. Above H&P reviewed and edited where appropriate. Agree with history and physical exam. Agree with assessment and plan. I reviewed the overnight course of events and discussed the care with the patient/ family.  All the decisions in assessment and plan are made by me

## 2024-01-26 NOTE — PROGRESS NOTE ADULT - NUTRITIONAL ASSESSMENT
This patient has been assessed with a concern for Malnutrition and has been determined to have a diagnosis/diagnoses of Severe protein-calorie malnutrition.    This patient is being managed with:   Diet DASH/TLC-  Sodium & Cholesterol Restricted  Supplement Feeding Modality:  Oral  Ensure Enlive Cans or Servings Per Day:  1       Frequency:  Daily  Entered: Jan 25 2024  4:45PM  
This patient has been assessed with a concern for Malnutrition and has been determined to have a diagnosis/diagnoses of Severe protein-calorie malnutrition.    This patient is being managed with:   Diet DASH/TLC-  Sodium & Cholesterol Restricted  Supplement Feeding Modality:  Oral  Ensure Enlive Cans or Servings Per Day:  1       Frequency:  Daily  Entered: Jan 25 2024  4:45PM

## 2024-01-26 NOTE — PROGRESS NOTE ADULT - ASSESSMENT
89-year-old female with past medical history of HTN, hypercholesterolemia in assisted living presents to the emergency department after a fall when she was coming out of the bathroom, she is supposed to use a walker, she was not using a walker, the glasses got impacted on the left side of her forehead.  Patient had recent cataract surgery of the right eye presently on prednisolone drops for that.  She is also followed by ophthalmology for left-sided macular degeneration. the patient had to crawl to the bed to ask for assistance. Has a laceration of the left side of the forehead. 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      ptn is euvolemic post 1.5 liters NS, she remains orthostatic 2/2 dysautonomia. HTN  cont toprol 50  seen by card. no events on tele. TTE is benign  low tsh, ENDO eval done, cont to monitor  cont outptn meds  PT eval done, recs are return to [facility] w home PT  DVT ppx w sc Lovenox  started on Namenda 5 mg bid, will cont  DC planning to [place] today  f/u w neuro and card

## 2024-01-26 NOTE — PROGRESS NOTE ADULT - SUBJECTIVE AND OBJECTIVE BOX
Chief complaint  Patient is a 89y old  Female who presents with a chief complaint of Fall (26 Jan 2024 08:33)         Labs and Fingersticks  CAPILLARY BLOOD GLUCOSE        Anion Gap: 16 (01-24 @ 14:27)      Calcium: 9.3 (01-24 @ 14:27)          01-24    143  |  103  |  33<H>  ----------------------------<  156<H>  4.1   |  24  |  1.09    Ca    9.3      24 Jan 2024 14:27  Mg     2.1     01-24      Medications  MEDICATIONS  (STANDING):  atorvastatin 10 milliGRAM(s) Oral at bedtime  enoxaparin Injectable 40 milliGRAM(s) SubCutaneous every 24 hours  ketorolac 0.5% Ophthalmic Solution 1 Drop(s) Right EYE daily  melatonin 3 milliGRAM(s) Oral at bedtime  memantine 5 milliGRAM(s) Oral two times a day  metoprolol succinate ER 50 milliGRAM(s) Oral daily  polyethylene glycol 3350 17 Gram(s) Oral daily  prednisoLONE acetate 1% Suspension 1 Drop(s) Right EYE two times a day  senna 2 Tablet(s) Oral at bedtime      Physical Exam  General: Patient comfortable in bed   Vital Signs Last 12 Hrs  T(F): 97.7 (01-26-24 @ 12:02), Max: 98.5 (01-26-24 @ 04:51)  HR: 57 (01-26-24 @ 12:02) (57 - 70)  BP: 120/51 (01-26-24 @ 12:02) (120/51 - 130/57)  BP(mean): --  RR: 18 (01-26-24 @ 12:02) (18 - 18)  SpO2: 93% (01-26-24 @ 12:02) (93% - 96%)    CVS: S1S2   Respiratory: No wheezing, no crepitations  GI: Abdomen soft, bowel sounds positive  Musculoskeletal:  moves all extremities  : Voiding       Chief complaint  Patient is a 89y old  Female who presents with a chief complaint of Fall (26 Jan 2024 08:33)         Labs and Fingersticks  CAPILLARY BLOOD GLUCOSE        Anion Gap: 16 (01-24 @ 14:27)      Calcium: 9.3 (01-24 @ 14:27)          01-24    143  |  103  |  33<H>  ----------------------------<  156<H>  4.1   |  24  |  1.09    Ca    9.3      24 Jan 2024 14:27  Mg     2.1     01-24      Medications  MEDICATIONS  (STANDING):  atorvastatin 10 milliGRAM(s) Oral at bedtime  enoxaparin Injectable 40 milliGRAM(s) SubCutaneous every 24 hours  ketorolac 0.5% Ophthalmic Solution 1 Drop(s) Right EYE daily  melatonin 3 milliGRAM(s) Oral at bedtime  memantine 5 milliGRAM(s) Oral two times a day  metoprolol succinate ER 50 milliGRAM(s) Oral daily  polyethylene glycol 3350 17 Gram(s) Oral daily  prednisoLONE acetate 1% Suspension 1 Drop(s) Right EYE two times a day  senna 2 Tablet(s) Oral at bedtime      Physical Exam  General: Patient comfortable in bed   Vital Signs Last 12 Hrs  T(F): 97.7 (01-26-24 @ 12:02), Max: 98.5 (01-26-24 @ 04:51)  HR: 57 (01-26-24 @ 12:02) (57 - 70)  BP: 120/51 (01-26-24 @ 12:02) (120/51 - 130/57)  BP(mean): --  RR: 18 (01-26-24 @ 12:02) (18 - 18)  SpO2: 93% (01-26-24 @ 12:02) (93% - 96%)    CVS: S1S2   Respiratory: No wheezing, no crepitations  GI: Abdomen soft, bowel sounds positive  Musculoskeletal:  moves all extremities  : Voiding

## 2024-01-26 NOTE — PROGRESS NOTE ADULT - PROBLEM SELECTOR PLAN 3
Will continue statin, primary team FU.

## 2024-01-26 NOTE — PROGRESS NOTE ADULT - PROVIDER SPECIALTY LIST ADULT
Internal Medicine
Cardiology
Internal Medicine
Cardiology
Endocrinology

## 2024-01-26 NOTE — CONSULT NOTE ADULT - SUBJECTIVE AND OBJECTIVE BOX
Neurology consult    EDIN [id number]     Patient is a 89y old  Female who presents with a chief complaint of Fall (26 Jan 2024 08:33)      HPI:  89-year-old female with past medical history of HTN, hypercholesterolemia in assisted living presents to the emergency department after a fall when she was coming out of the bathroom, she is supposed to use a walker, she was not using a walker, the glasses got impacted on the left side of her forehead.  Patient had recent cataract surgery of the right eye presently on prednisolone drops for that.  She is also followed by ophthalmology for left-sided macular degeneration. the patient had to crawl to the bed to ask for assistance. Has a laceration of the left side of the forehead. 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 (22 Jan 2024 23:14)      no difficulty with language.  No vision loss or double vision.  No dizziness, vertigo or new hearing loss.  . No difficulty with swallowing.  No focal weakness.  No focal sensory changes.  No numbness or tingling in the bilateral lower extremities.  No difficulty with balance.  No difficulty with ambulation.    REVIEW OF SYSTEMS:    Constitutional: No fever, chills, fatigue, weakness  Eyes: no eye pain, visual disturbances, or discharge  ENT:  No difficulty hearing, tinnitus, vertigo; No sinus or throat pain  Neck: No pain or stiffness  Respiratory: No cough, dyspnea, wheezing   Cardiovascular: No chest pain, palpitations,   Gastrointestinal: No abdominal or epigastric pain. No nausea, vomiting  No diarrhea or constipation.   Genitourinary: No dysuria, frequency, hematuria or incontinence  Neurological: No headaches, lightheadedness, vertigo, numbness or tremors  Psychiatric: No depression, anxiety, mood swings or difficulty sleeping  Musculoskeletal: No joint pain or swelling; No muscle, back or extremity pain  Skin: No itching, burning, rashes or lesions   Lymph Nodes: No enlarged glands  Endocrine: No heat or cold intolerance; No hair loss, No h/o diabetes or thyroid dysfunction  Allergy and Immunologic: No hives or eczema    MEDICATIONS    atorvastatin 10 milliGRAM(s) Oral at bedtime  clonazePAM  Tablet 0.25 milliGRAM(s) Oral every 8 hours PRN  cyclobenzaprine 5 milliGRAM(s) Oral three times a day PRN  enoxaparin Injectable 40 milliGRAM(s) SubCutaneous every 24 hours  ketorolac 0.5% Ophthalmic Solution 1 Drop(s) Right EYE daily  melatonin 3 milliGRAM(s) Oral at bedtime  memantine 5 milliGRAM(s) Oral two times a day  metoprolol succinate ER 50 milliGRAM(s) Oral daily  polyethylene glycol 3350 17 Gram(s) Oral daily  prednisoLONE acetate 1% Suspension 1 Drop(s) Right EYE two times a day  senna 2 Tablet(s) Oral at bedtime      PMH: HTN (hypertension)    HLD (hyperlipidemia)    Hard of hearing         PSH: S/P cataract surgery        Family history: No history of dementia, strokes, or seizures   FAMILY HISTORY:      SOCIAL HISTORY:  No history of tobacco or alcohol use     Allergies    adhesives (Unknown)  tetanus toxoid (Unknown)    Intolerances            Vital Signs Last 24 Hrs  T(C): 36.9 (26 Jan 2024 04:51), Max: 36.9 (26 Jan 2024 04:51)  T(F): 98.5 (26 Jan 2024 04:51), Max: 98.5 (26 Jan 2024 04:51)  HR: 70 (26 Jan 2024 04:51) (70 - 83)  BP: 130/57 (26 Jan 2024 04:51) (130/57 - 146/60)  BP(mean): --  RR: 18 (26 Jan 2024 04:51) (18 - 18)  SpO2: 96% (26 Jan 2024 04:51) (96% - 98%)    Parameters below as of 26 Jan 2024 04:51  Patient On (Oxygen Delivery Method): room air          On Neurological Examination:    Mental Status - Patient is alert, awake, oriented X2. fluent, names, no dysarthria no aphasia    Cranial Nerves - PERRL, EOMI, VFF, V1-V3 intact, no gross facial asymmetry, tongue/uvula midline    Motor Exam -   no drift    Sensory    Intact to light touch and pinprick bilaterally    Coord: FTN intact bilaterally     Gait -  normal      Reflexes:          2+ throughout                                               GENERAL Exam:     Nontoxic , No Acute Distress   	  HEENT:  normocephalic, atraumatic  		  LUNGS:	Clear bilaterally  No Wheeze    	  HEART:	 Normal S1S2   No murmur RRR        	  GI/ ABDOMEN:  Soft  Non tender    EXTREMITIES:   No Edema  No Clubbing  No Cyanosis No Edema    MUSCULOSKELETAL: Normal Range of Motion  	   SKIN:      Normal   No Ecchymosis               LABS:    Urinalysis Basic - ( 24 Jan 2024 14:27 )    Color: x / Appearance: x / SG: x / pH: x  Gluc: 156 mg/dL / Ketone: x  / Bili: x / Urobili: x   Blood: x / Protein: x / Nitrite: x   Leuk Esterase: x / RBC: x / WBC x   Sq Epi: x / Non Sq Epi: x / Bacteria: x      01-24    143  |  103  |  33<H>  ----------------------------<  156<H>  4.1   |  24  |  1.09    Ca    9.3      24 Jan 2024 14:27  Mg     2.1     01-24        Hemoglobin A1C:             RADIOLOGY  < from: CT Head No Cont (01.22.24 @ 15:30) >  IMPRESSION:  No acute intracranial hemorrhage.    No acute fracture facial bones.    No acute fracture cervical spine. If pain persists, follow-up MRI exam   recommended (if no contraindication).    --- End of Report ---            SKYLER GARIBAY MD; Attending Radiologist  This document has been electronically signed. Jan 22 2024  3:48PM    < end of copied text >  < from: CT Cervical Spine No Cont (01.22.24 @ 15:31) >      IMPRESSION:  No acute intracranial hemorrhage.    No acute fracture facial bones.    No acute fracture cervical spine. If pain persists, follow-up MRI exam   recommended (if no contraindication).    --- End of Report ---    < end of copied text >

## 2024-08-26 ENCOUNTER — NON-APPOINTMENT (OUTPATIENT)
Age: 89
End: 2024-08-26

## 2024-11-22 ENCOUNTER — EMERGENCY (EMERGENCY)
Facility: HOSPITAL | Age: 89
LOS: 1 days | Discharge: HOME | End: 2024-11-22
Attending: STUDENT IN AN ORGANIZED HEALTH CARE EDUCATION/TRAINING PROGRAM
Payer: MEDICARE

## 2024-11-22 VITALS
SYSTOLIC BLOOD PRESSURE: 191 MMHG | HEART RATE: 53 BPM | WEIGHT: 134.92 LBS | RESPIRATION RATE: 16 BRPM | DIASTOLIC BLOOD PRESSURE: 93 MMHG | TEMPERATURE: 98 F | OXYGEN SATURATION: 95 % | HEIGHT: 60 IN

## 2024-11-22 VITALS
OXYGEN SATURATION: 96 % | DIASTOLIC BLOOD PRESSURE: 80 MMHG | SYSTOLIC BLOOD PRESSURE: 196 MMHG | RESPIRATION RATE: 16 BRPM | HEART RATE: 58 BPM

## 2024-11-22 DIAGNOSIS — Z96.649 PRESENCE OF UNSPECIFIED ARTIFICIAL HIP JOINT: Chronic | ICD-10-CM

## 2024-11-22 DIAGNOSIS — Z98.49 CATARACT EXTRACTION STATUS, UNSPECIFIED EYE: Chronic | ICD-10-CM

## 2024-11-22 PROBLEM — I10 ESSENTIAL (PRIMARY) HYPERTENSION: Chronic | Status: ACTIVE | Noted: 2024-01-22

## 2024-11-22 PROBLEM — H91.90 UNSPECIFIED HEARING LOSS, UNSPECIFIED EAR: Chronic | Status: ACTIVE | Noted: 2024-01-22

## 2024-11-22 PROBLEM — E78.5 HYPERLIPIDEMIA, UNSPECIFIED: Chronic | Status: ACTIVE | Noted: 2024-01-22

## 2024-11-22 PROCEDURE — 73552 X-RAY EXAM OF FEMUR 2/>: CPT

## 2024-11-22 PROCEDURE — 72125 CT NECK SPINE W/O DYE: CPT | Mod: MC

## 2024-11-22 PROCEDURE — 72131 CT LUMBAR SPINE W/O DYE: CPT | Mod: 26,MC

## 2024-11-22 PROCEDURE — 76377 3D RENDER W/INTRP POSTPROCES: CPT

## 2024-11-22 PROCEDURE — 99284 EMERGENCY DEPT VISIT MOD MDM: CPT

## 2024-11-22 PROCEDURE — 99284 EMERGENCY DEPT VISIT MOD MDM: CPT | Mod: 25

## 2024-11-22 PROCEDURE — 70450 CT HEAD/BRAIN W/O DYE: CPT | Mod: MC

## 2024-11-22 PROCEDURE — 72125 CT NECK SPINE W/O DYE: CPT | Mod: 26,MC

## 2024-11-22 PROCEDURE — 72131 CT LUMBAR SPINE W/O DYE: CPT | Mod: MC

## 2024-11-22 PROCEDURE — 72192 CT PELVIS W/O DYE: CPT | Mod: MC

## 2024-11-22 PROCEDURE — 73552 X-RAY EXAM OF FEMUR 2/>: CPT | Mod: 26,LT

## 2024-11-22 PROCEDURE — 72170 X-RAY EXAM OF PELVIS: CPT | Mod: 26,XE

## 2024-11-22 PROCEDURE — 70450 CT HEAD/BRAIN W/O DYE: CPT | Mod: 26,MC

## 2024-11-22 PROCEDURE — 76377 3D RENDER W/INTRP POSTPROCES: CPT | Mod: 26

## 2024-11-22 PROCEDURE — 72170 X-RAY EXAM OF PELVIS: CPT

## 2024-11-22 PROCEDURE — 73502 X-RAY EXAM HIP UNI 2-3 VIEWS: CPT

## 2024-11-22 PROCEDURE — 73502 X-RAY EXAM HIP UNI 2-3 VIEWS: CPT | Mod: 26,LT

## 2024-11-22 PROCEDURE — 72192 CT PELVIS W/O DYE: CPT | Mod: 26,MC

## 2024-11-22 RX ORDER — ACETAMINOPHEN 500 MG
975 TABLET ORAL ONCE
Refills: 0 | Status: COMPLETED | OUTPATIENT
Start: 2024-11-22 | End: 2024-11-22

## 2024-11-22 NOTE — ED ADULT NURSE NOTE - NSFALLHARMRISKINTERV_ED_ALL_ED

## 2024-11-22 NOTE — ED PROVIDER NOTE - OBJECTIVE STATEMENT
89-year-old female past medical history of dysautonomia, hypertension, hyperlipidemia presents for evaluation of pain to the back of her head and left low back after a mechanical slip and fall in the shower prior to arrival.  Patient states that she was washing her hair when some shampoo fell onto the bathtub floor, despite having a bath mat she slipped backwards and fell on her back and hit her head.  Denies LOC, AC use, headache vision changes weakness nausea vomiting chest pain shortness of breath syncope dizziness.  Patient at the time had an aide who was assisting her, however the aide was not with her in the shower stall.  Daughter at bedside states that patient has LPNs scheduled to be with her and patient will not be alone if discharged home.  Patient denies any difficulty walking.

## 2024-11-22 NOTE — ED ADULT NURSE REASSESSMENT NOTE - NS ED NURSE REASSESS COMMENT FT1
Pt's daughter asking to speak to manager about CT result time. BLANCHE Cabrera notified. Dr. Huang speaking to pt - she is also aware pt is hypertensive.

## 2024-11-22 NOTE — ED ADULT NURSE NOTE - NSFALLLASTSIX_ED_ALL_ED
"Admission Medication History     The home medication history was taken by Viky Rutledge CPhT.    Medication history obtained from, Patient Verified    You may go to "Admission" then "Reconcile Home Medications" tabs to review and/or act upon these items.     The home medication list has been updated by the Pharmacy department.   Please read ALL comments highlighted in yellow.   Please address this information as you see fit.    Feel free to contact us if you have any questions or require assistance.      The medications listed below were removed from the home medication list.  Please reorder if appropriate:  Patient reports no longer taking the following medication(s):  Amoxicillin 500 mg  Nexplanon 68 mg implant  Famotidine 20 mg  Ferrous Sulfate 325 mg  Norco 5-325 mg  Ibuprofen 800 mg  Metronidazole 500 mg  Naproxen 500 mg  Omeprazole 20 mg  Prenatal Vitamin Plus Low Iron 27 mg-1 mg   Promethazine 25 mg   Valtrex 1000 mg        Viky Rutledge CPhT.  Ext [phone]               .        " Yes.

## 2024-11-22 NOTE — ED PROVIDER NOTE - PHYSICAL EXAMINATION
Constitutional: Well developed, well nourished. NAD  TRAUMA: ABC intact. GCS 15.   Head: Normocephalic, mild ecchymosis with mild hematoma, no lacerations   Eyes: PERRL. EOMI. No Raccoon eyes.   ENT:  No Tsang sign. Mucous membranes moist.  Neck: Supple. Painless ROM. No midline tenderness, stepoffs.  Cardiovascular: Regular rate and rhythm. No murmurs, rubs, or gallops.  Pulmonary: Normal respiratory rate and effort. Lungs clear to auscultation bilaterally. No wheezing, rales, or rhonchi.  CHEST: No chest wall tenderness, crepitus.  Abdominal: Soft. Nondistended. Nontender. No rebound, guarding, rigidity.  BACK: No midline T/L/S tenderness, stepoffs. left SI joint tenderness   Extremities. Pelvis stable. No traumatic deformities, mild left lateral hip tenderness, otherwise no tenderness of extremities, full ROM of UE and LE b/l, able to ambulate without difficulty   Skin: No rashes, cyanosis, lacerations, abrasions.  Neuro: AAOx3. Strength 5/5 in all extremities. Sensation intact throughout. No focal neurological deficits.  Psych: Normal mood. Normal affect. Constitutional: Well developed, well nourished. NAD  TRAUMA: ABC intact. GCS 15.   Head: Normocephalic, mild ecchymosis with mild hematoma, no lacerations   Eyes: PERRL. EOMI. No Raccoon eyes.   ENT:  No Tsang sign. Mucous membranes moist.  Neck: Supple. Painless ROM. No midline tenderness, stepoffs.  Cardiovascular: Regular rate and rhythm. No murmurs, rubs, or gallops.  Pulmonary: Normal respiratory rate and effort. Lungs clear to auscultation bilaterally. No wheezing, rales, or rhonchi.  CHEST: No chest wall tenderness, crepitus.  Abdominal: Soft. Nondistended. Nontender. No rebound, guarding, rigidity.  BACK: No midline T/L/S tenderness, stepoffs. left SI joint tenderness   Extremities. Pelvis stable. No traumatic deformities, anterior left thigh ttp, mild left lateral hip tenderness, otherwise no tenderness of extremities, full ROM of UE and LE b/l, able to ambulate without difficulty   Skin: No rashes, cyanosis, lacerations, abrasions.  Neuro: AAOx3. Strength 5/5 in all extremities. Sensation intact throughout. No focal neurological deficits.  Psych: Normal mood. Normal affect.

## 2024-11-22 NOTE — ED PROVIDER NOTE - CLINICAL SUMMARY MEDICAL DECISION MAKING FREE TEXT BOX
89-year-old female history of hypertension of lipidemia and dysautonomia presenting for evaluation after mechanical slip and fall without LOC prior to arrival.  Patient has some mild tenderness to palpation in the left SI joint, left lateral hip however is able to ambulate without issue and without weakness or significant pain.  There are some ecchymosis to the occiput without significant hematoma or laceration.  No midline spinal tenderness C-spine cleared clinically, no evidence of injury to the extremity.    Differential diagnosis includes but is not limited to intracranial trauma versus occult pelvic fracture versus subtle periprosthetic hip fracture.  Plan for x-rays, CTs, pain control with Tylenol.  Anticipate patient will be a safe discharge home as daughter at bedside states that they have a plan to ensure patient's safety at home, patient has 2 LPNs scheduled to stay with her and patient does live in an assisted living facility. 89-year-old female history of hypertension of lipidemia and dysautonomia presenting for evaluation after mechanical slip and fall without LOC prior to arrival.  Patient has some mild tenderness to palpation in the left SI joint, left lateral hip however is able to ambulate without issue and without weakness or significant pain.  There are some ecchymosis to the occiput without significant hematoma or laceration.  No midline spinal tenderness C-spine cleared clinically, no evidence of injury to the extremity.    Differential diagnosis includes but is not limited to intracranial trauma versus occult pelvic fracture versus subtle periprosthetic hip fracture.  Plan for x-rays, CTs, pain control with Tylenol.  Anticipate patient will be a safe discharge home as daughter at bedside states that they have a plan to ensure patient's safety at home, patient has 2 LPNs scheduled to stay with her and patient does live in an assisted living facility.    Attending Deb: See attending attestation.

## 2024-11-22 NOTE — ED PROVIDER NOTE - NSFOLLOWUPINSTRUCTIONS_ED_ALL_ED_FT
You were seen in the emergency room for pain after a fall, at this time your imaging shows no acute injury.  We recommend you follow-up with your primary care physician this week for further management.    Please return to emergency room if you experience any new or worsening symptoms, you may take Tylenol as needed for pain.  Please follow all package instructions You were seen in the emergency room for pain after a fall, at this time your imaging shows no acute injury.  We recommend you follow-up with your primary care physician this week for further management.    Please return to emergency room if you experience any new or worsening symptoms, you may take Tylenol as needed for pain.  Please follow all package instructions    If not improved in 2 weeks, follow with either:     [facility] (comprehensive, with Neurology, Orthopedic Surgery, Neurosurgery, and Physical Therapy): [phone].    all results are included in this discharge packet

## 2024-11-22 NOTE — ED PROVIDER NOTE - NSICDXPASTMEDICALHX_GEN_ALL_CORE_FT
PAST MEDICAL HISTORY:  Dysautonomia     Hard of hearing     HLD (hyperlipidemia)     HTN (hypertension)

## 2024-11-22 NOTE — ED PROVIDER NOTE - PROGRESS NOTE DETAILS
Attending Deb: signed out to Dr. Vaughn pending CTs. If no emergent findings, plan for DC home Violetta Huang MD, PGY3: imaging reads thus far negative. pending CT lumbar spine. pt and family updated. pt took home BP meds under MD direction. Violetta Huang MD, PGY3:  Reviewed results of CT, no acute injury noted.  Patient with chronic stenosis, patient however able to ambulate and has sensation with no bladder dysfunction.  BP improving with home medication, patient has dysautonomia and is under careful titration of BP meds with her primary care physician.  Discussed all findings with patient and daughter at bedside they agree with plan for discharge home with outpatient follow-up, will give patient spine referral.  All questions answered time.  Patient ready for discharge.

## 2024-11-22 NOTE — ED PROVIDER NOTE - PATIENT PORTAL LINK FT
You can access the FollowMyHealth Patient Portal offered by [facility] by registering at the following website: [email or web address]. By joining [facility]’s FollowMyHealth portal, you will also be able to view your health information using other applications (apps) compatible with our system.

## 2024-11-22 NOTE — ED ADULT NURSE NOTE - OBJECTIVE STATEMENT
89 y/o female presents to ED from the [facility] via EMS s/p mechanical slip and fall. Pt states she was taking a shower and slipped on soapy floor, fell back onto her back and hit back of head. Denies LOC, blood thinner use. Pt's aid came into the bathroom when she heard pt fall. Pt denying chest pain, SOB, dizziness, syncope prior or post fall. Pt is A&O x 4. Breathing even and unlabored. Gross motor and neuro intact. Ambulates with steady gait (uses waker at home, 1 assist in ED). Safety and comfort provided. Daughter at bedside.

## 2025-05-12 ENCOUNTER — NON-APPOINTMENT (OUTPATIENT)
Age: 89
End: 2025-05-12

## 2025-07-03 NOTE — PROGRESS NOTE ADULT - PROBLEM SELECTOR PLAN 1
Subclinical  Suggest to repeat thyroid function test in 4-6 weeks. Outpatient follow up.   Outpatient Thyroid US for surveillance follow up.
Subclinical, asymptomatic  Suggest to repeat thyroid function test in 4-6 weeks. Outpatient follow up.   Outpatient Thyroid US for surveillance follow up.
Subclinical, asymptomatic  Suggest to repeat thyroid function test in 4-6 weeks. Outpatient follow up.   Outpatient Thyroid US for surveillance follow up.
Attending Attestation (For Attendings USE Only)...